# Patient Record
Sex: FEMALE | Race: WHITE | NOT HISPANIC OR LATINO | Employment: FULL TIME | URBAN - METROPOLITAN AREA
[De-identification: names, ages, dates, MRNs, and addresses within clinical notes are randomized per-mention and may not be internally consistent; named-entity substitution may affect disease eponyms.]

---

## 2017-01-16 DIAGNOSIS — Z30.41 ENCOUNTER FOR SURVEILLANCE OF CONTRACEPTIVE PILLS: Primary | ICD-10-CM

## 2017-01-16 NOTE — TELEPHONE ENCOUNTER
norgestimate-ethinyl estradiol (ORTHO-CYCLEN, SPRINTEC) 0.25-35 MG-MCG per tablet      Last Written Prescription Date: 1-  Last Fill Quantity: 84, # refills: 3  Last Office Visit with SULLY, QUANG or [facility] prescribing provider: 10-7-2016       BP Readings from Last 3 Encounters:   10/07/16 118/72   10/05/16 130/99   04/16/16 128/69     Date of last Breast Exam: na

## 2017-01-17 RX ORDER — NORGESTIMATE AND ETHINYL ESTRADIOL 0.25-0.035
1 KIT ORAL DAILY
Qty: 84 TABLET | Refills: 3 | Status: SHIPPED | OUTPATIENT
Start: 2017-01-17 | End: 2018-01-19

## 2017-01-18 ENCOUNTER — TELEPHONE (OUTPATIENT)
Dept: FAMILY MEDICINE | Facility: CLINIC | Age: 32
End: 2017-01-18

## 2017-05-12 ENCOUNTER — OFFICE VISIT (OUTPATIENT)
Dept: FAMILY MEDICINE | Facility: CLINIC | Age: 32
End: 2017-05-12
Payer: COMMERCIAL

## 2017-05-12 ENCOUNTER — TELEPHONE (OUTPATIENT)
Dept: NURSING | Facility: CLINIC | Age: 32
End: 2017-05-12

## 2017-05-12 VITALS
HEIGHT: 58 IN | SYSTOLIC BLOOD PRESSURE: 124 MMHG | WEIGHT: 159.2 LBS | BODY MASS INDEX: 33.42 KG/M2 | DIASTOLIC BLOOD PRESSURE: 78 MMHG | HEART RATE: 96 BPM | TEMPERATURE: 99.2 F

## 2017-05-12 DIAGNOSIS — R63.5 ABNORMAL WEIGHT GAIN: ICD-10-CM

## 2017-05-12 DIAGNOSIS — F41.9 ANXIETY: ICD-10-CM

## 2017-05-12 DIAGNOSIS — J45.21 MILD INTERMITTENT ASTHMA WITH ACUTE EXACERBATION: ICD-10-CM

## 2017-05-12 DIAGNOSIS — G43.009 MIGRAINE WITHOUT AURA AND WITHOUT STATUS MIGRAINOSUS, NOT INTRACTABLE: ICD-10-CM

## 2017-05-12 DIAGNOSIS — Z32.01 PREGNANCY TEST POSITIVE: Primary | ICD-10-CM

## 2017-05-12 LAB
B-HCG SERPL-ACNC: ABNORMAL IU/L (ref 0–5)
BETA HCG QUAL IFA URINE: POSITIVE
HCG SERPL QL: POSITIVE
TSH SERPL DL<=0.005 MIU/L-ACNC: 0.52 MU/L (ref 0.4–4)

## 2017-05-12 PROCEDURE — 99214 OFFICE O/P EST MOD 30 MIN: CPT | Performed by: NURSE PRACTITIONER

## 2017-05-12 PROCEDURE — 36415 COLL VENOUS BLD VENIPUNCTURE: CPT | Performed by: NURSE PRACTITIONER

## 2017-05-12 PROCEDURE — 84703 CHORIONIC GONADOTROPIN ASSAY: CPT | Performed by: NURSE PRACTITIONER

## 2017-05-12 PROCEDURE — 84702 CHORIONIC GONADOTROPIN TEST: CPT | Performed by: NURSE PRACTITIONER

## 2017-05-12 PROCEDURE — 84443 ASSAY THYROID STIM HORMONE: CPT | Performed by: NURSE PRACTITIONER

## 2017-05-12 RX ORDER — OXYCODONE AND ACETAMINOPHEN 5; 325 MG/1; MG/1
1 TABLET ORAL EVERY 4 HOURS PRN
Qty: 5 TABLET | Refills: 0 | Status: SHIPPED | OUTPATIENT
Start: 2017-05-12 | End: 2017-12-20

## 2017-05-12 RX ORDER — ALBUTEROL SULFATE 90 UG/1
2 AEROSOL, METERED RESPIRATORY (INHALATION) EVERY 6 HOURS PRN
Qty: 1 INHALER | Refills: 1 | Status: SHIPPED | OUTPATIENT
Start: 2017-05-12

## 2017-05-12 ASSESSMENT — ENCOUNTER SYMPTOMS
DIAPHORESIS: 0
FEVER: 0
NERVOUS/ANXIOUS: 1
DIARRHEA: 0
SINUS PRESSURE: 0
NAUSEA: 0
EYE DISCHARGE: 0
HEADACHES: 1
DIZZINESS: 0
CHILLS: 0
SORE THROAT: 0
FATIGUE: 1
RHINORRHEA: 0
LIGHT-HEADEDNESS: 0
EYE ITCHING: 0
UNEXPECTED WEIGHT CHANGE: 1
CONSTIPATION: 0
SHORTNESS OF BREATH: 0
ABDOMINAL PAIN: 1
DYSPHORIC MOOD: 1
COUGH: 0
WHEEZING: 0

## 2017-05-12 ASSESSMENT — ANXIETY QUESTIONNAIRES
5. BEING SO RESTLESS THAT IT IS HARD TO SIT STILL: MORE THAN HALF THE DAYS
GAD7 TOTAL SCORE: 14
3. WORRYING TOO MUCH ABOUT DIFFERENT THINGS: MORE THAN HALF THE DAYS
7. FEELING AFRAID AS IF SOMETHING AWFUL MIGHT HAPPEN: SEVERAL DAYS
2. NOT BEING ABLE TO STOP OR CONTROL WORRYING: MORE THAN HALF THE DAYS
6. BECOMING EASILY ANNOYED OR IRRITABLE: NEARLY EVERY DAY
1. FEELING NERVOUS, ANXIOUS, OR ON EDGE: MORE THAN HALF THE DAYS

## 2017-05-12 ASSESSMENT — PATIENT HEALTH QUESTIONNAIRE - PHQ9: 5. POOR APPETITE OR OVEREATING: MORE THAN HALF THE DAYS

## 2017-05-12 NOTE — LETTER
My Asthma Action Plan  Name: Emily Moreira   YOB: 1985  Date: 5/12/2017   My doctor: AMIRA Polo CNP   My clinic: [facility]        My Control Medicine:   My Rescue Medicine: Albuterol   My Asthma Severity: intermittent  Avoid your asthma triggers:                GREEN ZONE     Good Control    I feel good    No cough or wheeze    Can work, sleep and play without asthma symptoms       Take your asthma control medicine every day.     1. If exercise triggers your asthma, take your rescue medication    15 minutes before exercise or sports, and    During exercise if you have asthma symptoms  2. Spacer to use with inhaler: If you have a spacer, make sure to use it with your inhaler             YELLOW ZONE     Getting Worse  I have ANY of these:    I do not feel good    Cough or wheeze    Chest feels tight    Wake up at night   1. Keep taking your Green Zone medications  2. Start taking your rescue medicine:    every 20 minutes for up to 1 hour. Then every 4 hours for 24-48 hours.  3. If you stay in the Yellow Zone for more than 12-24 hours, contact your doctor.  4. If you do not return to the Green Zone in 12-24 hours or you get worse, start taking your oral steroid medicine if prescribed by your provider.           RED ZONE     Medical Alert - Get Help  I have ANY of these:    I feel awful    Medicine is not helping    Breathing getting harder    Trouble walking or talking    Nose opens wide to breathe       1. Take your rescue medicine NOW  2. If your provider has prescribed an oral steroid medicine, start taking it NOW  3. Call your doctor NOW  4. If you are still in the Red Zone after 20 minutes and you have not reached your doctor:    Take your rescue medicine again and    Call 911 or go to the emergency room right away    See your regular doctor within 2 weeks of an Emergency Room or Urgent Care visit for follow-up treatment.            Annual Reminders:  Meet with  Asthma Educator,  Flu Shot in the Fall, consider Pneumonia Vaccination for patients with asthma (aged 19 and older).    Pharmacy:    [id number] - [place], [place] - [address] AT [facility] & [address] [place] [address] [id number] - [place], [place] - [address]                    Asthma Triggers  How To Control Things That Make Your Asthma Worse    Triggers are things that make your asthma worse.  Look at the list below to help you find your triggers and what you can do about them.  You can help prevent asthma flare-ups by staying away from your triggers.      Trigger                                                          What you can do   Cigarette Smoke  Tobacco smoke can make asthma worse. Do not allow smoking in your home, car or around you.  Be sure no one smokes at a child s day care or school.  If you smoke, ask your health care provider for ways to help you quit.  Ask family members to quit too.  Ask your health care provider for a referral to Quit Plan to help you quit smoking, or call 1-560-628-PLAN.     Colds, Flu, Bronchitis  These are common triggers of asthma. Wash your hands often.  Don t touch your eyes, nose or mouth.  Get a flu shot every year.     Dust Mites  These are tiny bugs that live in cloth or carpet. They are too small to see. Wash sheets and blankets in hot water every week.   Encase pillows and mattress in dust mite proof covers.  Avoid having carpet if you can. If you have carpet, vacuum weekly.   Use a dust mask and HEPA vacuum.   Pollen and Outdoor Mold  Some people are allergic to trees, grass, or weed pollen, or molds. Try to keep your windows closed.  Limit time out doors when pollen count is high.   Ask you health care provider about taking medicine during allergy season.     Animal Dander  Some people are allergic to skin flakes, urine or saliva from pets with fur or feathers. Keep pets with fur or feathers out of your home.    If you can t keep  the pet outdoors, then keep the pet out of your bedroom.  Keep the bedroom door closed.  Keep pets off cloth furniture and away from stuffed toys.     Mice, Rats, and Cockroaches  Some people are allergic to the waste from these pests.   Cover food and garbage.  Clean up spills and food crumbs.  Store grease in the refrigerator.   Keep food out of the bedroom.   Indoor Mold  This can be a trigger if your home has high moisture. Fix leaking faucets, pipes, or other sources of water.   Clean moldy surfaces.  Dehumidify basement if it is damp and smelly.   Smoke, Strong Odors, and Sprays  These can reduce air quality. Stay away from strong odors and sprays, such as perfume, powder, hair spray, paints, smoke incense, paint, cleaning products, candles and new carpet.   Exercise or Sports  Some people with asthma have this trigger. Be active!  Ask your doctor about taking medicine before sports or exercise to prevent symptoms.    Warm up for 5-10 minutes before and after sports or exercise.     Other Triggers of Asthma  Cold air:  Cover your nose and mouth with a scarf.  Sometimes laughing or crying can be a trigger.  Some medicines and food can trigger asthma.

## 2017-05-12 NOTE — LETTER
[facility]  [address] [place] [address]  [address]  [phone]      May 15, 2017      Emily Moreira  [address] [facility] [address]              Dear Ms. Moreira,    The blood test that you had drawn puts you somewhere between 6-8 weeks pregnant. Please refer to OB/GYN of choice.     Please call or email with any additional questions or concerns         Sincerely,    AMIRA Berg CNP/EC CMA

## 2017-05-12 NOTE — LETTER
My Depression Action Plan  Name: Emily Moreira   Date of Birth 1985  Date: 5/12/2017    My doctor: Prachi Flores   My clinic: [address] [address] [phone]          GREEN    ZONE   Good Control    What it looks like:     Things are going generally well. You have normal up s and down s. You may even feel depressed from time to time, but bad moods usually last less than a day.   What you need to do:  1. Continue to care for yourself (see self care plan)  2. Check your depression survival kit and update it as needed  3. Follow your physician s recommendations including any medication.  4. Do not stop taking medication unless you consult with your physician first.           YELLOW         ZONE Getting Worse    What it looks like:     Depression is starting to interfere with your life.     It may be hard to get out of bed; you may be starting to isolate yourself from others.    Symptoms of depression are starting to last most all day and this has happened for several days.     You may have suicidal thoughts but they are not constant.   What you need to do:     1. Call your care team, your response to treatment will improve if you keep your care team informed of your progress. Yellow periods are signs an adjustment may need to be made.     2. Continue your self-care, even if you have to fake it!    3. Talk to someone in your support network    4. Open up your depression survival kit           RED    ZONE Medical Alert - Get Help    What it looks like:     Depression is seriously interfering with your life.     You may experience these or other symptoms: You can t get out of bed most days, can t work or engage in other necessary activities, you have trouble taking care of basic hygiene, or basic responsibilities, thoughts of suicide or death that will not go away, self-injurious behavior.     What you need to do:  1. Call your care team  and request a same-day appointment. If they are not available (weekends or after hours) call your local crisis line, emergency room or 911.          Depression Self Care Plan / Survival Kit    Self-Care for Depression  Here s the deal. Your body and mind are really not as separate as most people think.  What you do and think affects how you feel and how you feel influences what you do and think. This means if you do things that people who feel good do, it will help you feel better.  Sometimes this is all it takes.  There is also a place for medication and therapy depending on how severe your depression is, so be sure to consult with your medical provider and/ or Behavioral Health Consultant if your symptoms are worsening or not improving.     In order to better manage my stress, I will:    Exercise  Get some form of exercise, every day. This will help reduce pain and release endorphins, the  feel good  chemicals in your brain. This is almost as good as taking antidepressants!  This is not the same as joining a gym and then never going! (they count on that by the way ) It can be as simple as just going for a walk or doing some gardening, anything that will get you moving.      Hygiene   Maintain good hygiene (Get out of bed in the morning, Make your bed, Brush your teeth, Take a shower, and Get dressed like you were going to work, even if you are unemployed).  If your clothes don't fit try to get ones that do.    Diet  I will strive to eat foods that are good for me, drink plenty of water, and avoid excessive sugar, caffeine, alcohol, and other mood-altering substances.  Some foods that are helpful in depression are: complex carbohydrates, B vitamins, flaxseed, fish or fish oil, fresh fruits and vegetables.    Psychotherapy  I agree to participate in Individual Therapy (if recommended).    Medication  If prescribed medications, I agree to take them.  Missing doses can result in serious side effects.  I understand  that drinking alcohol, or other illicit drug use, may cause potential side effects.  I will not stop my medication abruptly without first discussing it with my provider.    Staying Connected With Others  I will stay in touch with my friends, family members, and my primary care provider/team.    Use your imagination  Be creative.  We all have a creative side; it doesn t matter if it s oil painting, sand castles, or mud pies! This will also kick up the endorphins.    Witness Beauty  (AKA stop and smell the roses) Take a look outside, even in mid-winter. Notice colors, textures. Watch the squirrels and birds.     Service to others  Be of service to others.  There is always someone else in need.  By helping others we can  get out of ourselves  and remember the really important things.  This also provides opportunities for practicing all the other parts of the program.    Humor  Laugh and be silly!  Adjust your TV habits for less news and crime-drama and more comedy.    Control your stress  Try breathing deep, massage therapy, biofeedback, and meditation. Find time to relax each day.     My support system    Clinic Contact:  Phone number:    Contact 1:  Phone number:    Contact 2:  Phone number:    Muslim/:  Phone number:    Therapist:  Phone number:    Local crisis center:    Phone number:    Other community support:  Phone number:

## 2017-05-12 NOTE — PATIENT INSTRUCTIONS
Try and stop smoking  Stop taking birth control pills  Start taking over the counter prenatal vitamin

## 2017-05-12 NOTE — MR AVS SNAPSHOT
"              After Visit Summary   5/12/2017    Emily Moreira    MRN: [id number]           Patient Information     Date Of Birth          1985        Visit Information        Provider Department      5/12/2017 11:20 AM Angela Michele APRN [facility]        Today's Diagnoses     Pregnancy test positive    -  1    Migraine without aura and without status migrainosus, not intractable        Mild intermittent asthma with acute exacerbation        Anxiety        Abnormal weight gain          Care Instructions    Try and stop smoking  Stop taking birth control pills  Start taking over the counter prenatal vitamin             Follow-ups after your visit        Future tests that were ordered for you today     Open Future Orders        Priority Expected Expires Ordered    US OB < 14 Weeks w Transvaginal Routine  5/12/2018 5/12/2017            Who to contact     Normal or non-critical lab and imaging results will be communicated to you by Yatangohart, letter or phone within 4 business days after the clinic has received the results. If you do not hear from us within 7 days, please contact the clinic through MyChart or phone. If you have a critical or abnormal lab result, we will notify you by phone as soon as possible.  Submit refill requests through [facility] or call your pharmacy and they will forward the refill request to us. Please allow 3 business days for your refill to be completed.          If you need to speak with a  for additional information , please call: [phone]           Additional Information About Your Visit        YatangoharMyTinks Information     [employer] lets you send messages to your doctor, view your test results, renew your prescriptions, schedule appointments and more. To sign up, go to [email or web address] . Click on \"Log in\" on the left side of the screen, which will take you to the Welcome page. Then click on \"Sign up Now\" on the right side of the " "page.     You will be asked to enter the access code listed below, as well as some personal information. Please follow the directions to create your username and password.     Your access code is: [email or web address]  Expires: 8/10/2017 12:00 PM     Your access code will  in 90 days. If you need help or a new code, please call your [facility] clinic or [phone].        Care EveryWhere ID     This is your Care EveryWhere ID. This could be used by other organizations to access your [facility] medical records  [id number]        Your Vitals Were     Pulse Temperature Height Last Period BMI (Body Mass Index)       96 99.2  F (37.3  C) (Tympanic) 4' 9.5\" (1.461 m) (LMP Unknown) 33.85 kg/m2        Blood Pressure from Last 3 Encounters:   17 124/78   10/07/16 118/72   10/05/16 (!) 130/99    Weight from Last 3 Encounters:   17 159 lb 3.2 oz (72.2 kg)   10/07/16 154 lb 2 oz (69.9 kg)   16 164 lb 12.8 oz (74.8 kg)              We Performed the Following     Asthma Action Plan (AAP)     Beta HCG qual IFA urine     DEPRESSION ACTION PLAN (DAP)     HCG, qualitative     HCG, quantitative, pregnancy     MIGRAINE ACTION PLAN     TSH with free T4 reflex          Today's Medication Changes          These changes are accurate as of: 17 12:00 PM.  If you have any questions, ask your nurse or doctor.               These medicines have changed or have updated prescriptions.        Dose/Directions    * albuterol (2.5 MG/3ML) 0.083% neb solution   This may have changed:  Another medication with the same name was added. Make sure you understand how and when to take each.        Dose:  1 vial   Take 1 vial (2.5 mg) by nebulization every 6 hours as needed for shortness of breath / dyspnea or wheezing   Quantity:  75 mL   Refills:  0       * albuterol 108 (90 BASE) MCG/ACT Inhaler   Commonly known as:  PROAIR HFA/PROVENTIL HFA/VENTOLIN HFA   This may have changed:  You were already taking a medication with the same name, " and this prescription was added. Make sure you understand how and when to take each.   Used for:  Mild intermittent asthma with acute exacerbation   Changed by:  Angela Michele APRN CNP        Dose:  2 puff   Inhale 2 puffs into the lungs every 6 hours as needed for shortness of breath / dyspnea or wheezing   Quantity:  1 Inhaler   Refills:  1       oxyCODONE-acetaminophen 5-325 MG per tablet   Commonly known as:  PERCOCET   This may have changed:  how much to take   Used for:  Migraine without aura and without status migrainosus, not intractable   Changed by:  Angela Michele APRN CNP        Dose:  1 tablet   Take 1 tablet by mouth every 4 hours as needed for pain   Quantity:  5 tablet   Refills:  0       * Notice:  This list has 2 medication(s) that are the same as other medications prescribed for you. Read the directions carefully, and ask your doctor or other care provider to review them with you.         Where to get your medicines      These medications were sent to [facility] [address]  [address] [facility] [address]     Phone:  [phone]     albuterol 108 (90 BASE) MCG/ACT Inhaler         Some of these will need a paper prescription and others can be bought over the counter.  Ask your nurse if you have questions.     Bring a paper prescription for each of these medications     oxyCODONE-acetaminophen 5-325 MG per tablet                Primary Care Provider Office Phone # Fax #    Prachi Flores PA-C [phone]       [address] [address]        Thank you!     Thank you for choosing [facility]  for your care. Our goal is always to provide you with excellent care. Hearing back from our patients is one way we can continue to improve our services. Please take a few minutes to complete the written survey that you may receive in the mail after your visit  with us. Thank you!             Your Updated Medication List - Protect others around you: Learn how to safely use, store and throw away your medicines at www.disposemymeds.org.          This list is accurate as of: 5/12/17 12:00 PM.  Always use your most recent med list.                   Brand Name Dispense Instructions for use    * albuterol (2.5 MG/3ML) 0.083% neb solution     75 mL    Take 1 vial (2.5 mg) by nebulization every 6 hours as needed for shortness of breath / dyspnea or wheezing       * albuterol 108 (90 BASE) MCG/ACT Inhaler    PROAIR HFA/PROVENTIL HFA/VENTOLIN HFA    1 Inhaler    Inhale 2 puffs into the lungs every 6 hours as needed for shortness of breath / dyspnea or wheezing       albuterol 90 MCG/ACT inhaler     1 Inhaler    2 puffs INH every 4-6 hrs       cyclobenzaprine 10 MG tablet    FLEXERIL    14 tablet    Take 1 tablet (10 mg) by mouth nightly as needed for muscle spasms       ibuprofen 400 MG tablet    ADVIL/MOTRIN    120 tablet    Take 1-2 tablets (400-800 mg) by mouth every 6 hours as needed for other (cramping)       norgestimate-ethinyl estradiol 0.25-35 MG-MCG per tablet    ORTHO-CYCLEN, SPRINTEC    84 tablet    Take 1 tablet by mouth daily       OMEPRAZOLE PO      Take 40 mg by mouth       oxyCODONE-acetaminophen 5-325 MG per tablet    PERCOCET    5 tablet    Take 1 tablet by mouth every 4 hours as needed for pain       sertraline 50 MG tablet    ZOLOFT    30 tablet    Take 1/2 tablet (25 mg) for 1-2 weeks, then increase to 1 tablet orally daily       sucralfate 1 GM tablet    CARAFATE    42 tablet    Take 1 tablet (1 g) by mouth 4 times daily       SUMAtriptan 25 MG tablet    IMITREX    9 tablet    Take 1-2 tablets (25-50 mg) by mouth at onset of headache for migraine May repeat dose in 2 hours.  Do not exceed 200 mg in 24 hours       * Notice:  This list has 2 medication(s) that are the same as other medications prescribed for you. Read the directions carefully, and ask your  doctor or other care provider to review them with you.

## 2017-05-12 NOTE — TELEPHONE ENCOUNTER
Call Type: Triage Call    Presenting Problem: Caller says that she just found out that she is  pregnant, may randi three months along. She has been having some  abominal pain that she thought might be acid reflujx. She does have  a regujlarlhy scheduled apointment for 11 am, but they don't have an  ultrasound at her clinic so she is asking her options for gaetting  an ultrasound done today.  Triage Note:  Guideline Title: Pregnancy: Abdominal Pain  Recommended Disposition: See Provider within 24 hours  Original Inclination: Wanted to speak with a nurse  Override Disposition:  Intended Action: Follow advice given  Physician Contacted: No  New onset of epigastric or right upper quadrant pain ?  YES  Signs of dehydration ? NO  IUD or cerclage in place ? NO  Fluid leaking or rupture of membranes ? NO  Severe breathing problems ? NO  Recent version procedure ? NO  New or worsening signs and symptoms that may indicate shock ? NO  Less than 20 weeks gestation AND vaginal bleeding ? NO  More than 20 weeks gestation AND vaginal bleeding ? NO  Gestation more than 37 weeks AND signs of labor ? NO  Gestation less than 20 weeks AND signs of labor ? NO  20-37 weeks gestation AND signs of labor ? NO  Generalized abdominal pain that progresses to localized pain ? NO  Sudden onset or recurring abdominal pain that radiates to upper back or shoulder ?  NO  Pain/discomfort over bladder AND urinary tract symptoms ? NO  Greater than 20 weeks AND mild abdominal pain ? NO  Passing red, black or tarry material from rectum AND onset of new signs and  symptoms of hypovolemia ? NO  Unbearable abdominal/pelvic pain or cramping ? NO  Any cardiac signs/symptoms for more than 5 minutes, now or within last hour ? NO  Vomiting red, bloody or coffee-ground material, more than streaks of blood or  scant amount (not following nosebleed within past day) ? NO  Mild abdominal cramping AND 4 or more diarrheal stools in 24 hours ? NO  Temperature 100 F (37.7  "C) or greater ? NO  Burning sensation in the epigastic area AND no other abdominal discomfort or other  related symptoms ? NO  Generalized abdominal pain that progresses to localized pain AND any of the  following: loss of appetite, vomiting starting after pain, any fever, OR unable  to carry out normal activities ? NO  Abdominal pain that has steadily worsened over hours OR has been continuous for 3  hours or more AND any of the following: loss of appetite, vomiting starting after  pain, any fever, OR unable to carry out normal activities ? NO  Sudden onset or recurring abdominal pain that radiates to upper back or shoulder  AND any of the following: loss of appetite, vomiting starting after pain, any  fever, OR unable to carry out normal activities ? NO  Pain in flank, low back, over bladder, groin or perineum AND sharp pain with  urination, or felt something \"pass\" with urination, or blood in urine ? NO  Recent amniocentesis/chorionic villus sampling ([facility]) ? NO  Minimal to mild abdominal pain or occasional abdominal cramping AND foul smelling  or unusual vaginal discharge ? NO  New onset of epigastric or right upper quadrant pain with known pregnancy induced  hypertension ? NO  Pregnant and has a temperature up to 100 F (37.7 C) that has not responded to 3  days of home care ? NO  Continuous bright red vaginal bleeding for more than 15 minutes (more than  spotting) ? NO  Gestation 20 weeks or more AND decreased fetal movement compared to previous  activity ? NO  Constipation for 3 days or more that has not resolved with home care ? NO  Unbearable headache ? NO  Abdominal or pelvic pain that has steadily worsened; has been continuous for 3  hours or more; OR that had stopped and has now returned ? NO  Injury to abdomen or pelvis ? NO  Physician Instructions:  Care Advice: Eat smaller, more frequent meals  eat slowly and allow one-half hour to relax after eating.  Call provider immediately if develop severe pain, " black, tarry stools,  bloody stools, blood-streaked or coffee ground-looking vomitus, or abdomen  swollen.

## 2017-05-12 NOTE — NURSING NOTE
"Chief Complaint   Patient presents with     Confirmation Of Pregnancy       Initial /78 (BP Location: Left arm, Patient Position: Chair, Cuff Size: Adult Regular)  Pulse 96  Temp 99.2  F (37.3  C) (Tympanic)  Ht 4' 9.5\" (1.461 m)  Wt 159 lb 3.2 oz (72.2 kg)  LMP  (LMP Unknown)  BMI 33.85 kg/m2 Estimated body mass index is 33.85 kg/(m^2) as calculated from the following:    Height as of this encounter: 4' 9.5\" (1.461 m).    Weight as of this encounter: 159 lb 3.2 oz (72.2 kg).  Medication Reconciliation: complete     April [facility]      "

## 2017-05-12 NOTE — LETTER
My Migraine Action Plan      Date: 5/12/2017     My Name: Emily Moreira   YOB: 1985  My Pharmacy:    [id number] - [place], [place] - [address] AT [facility] & [address] [place] [address] [id number] - [place], [place] - [address]       My (Preventative) Control Medicine:         My Rescue Medicine: Imitrex   My Doctor: Prachi Flores     My Clinic: [facility]  [address] [place] [id number] [phone]        GREEN ZONE = Good Control    My headache plan is working.   I can do what I need to do.           I WILL:     ? Keep managing my triggers.  ? Write in my migraine diary each time I have a headache.  ? Keep taking my preventive (controller) medicine daily.  ? Take my relief and rescue medicine as needed.             YELLOW ZONE = Not Enough Control    My headache plan isn t always working.   My headaches keep me from doing   some of the things I need to do.       I WILL:     ? Set goals to control my triggers and act on them.  ? Write in my migraine diary each time I have a headache and review it for                      patterns or new triggers.  ? Keep taking my preventive (controller) medicine daily.  ? Take my relief and rescue medicine as needed.  ? Call my doctor or clinic at if I stay in the Yellow Zone.             RED ZONE = Poor or No Control    My headache plan has  failed. I can t do anything  when I have one. My  medicines aren t working.           I WILL:   ? Set goals to control my triggers and act on them.  ? Write in my migraine diary each time I have a headache and review it for                      patterns or new triggers.  ? Keep taking my preventive (controller) medicine daily.  ? Take my relief and rescue medicine as needed.  ? Call my doctor or clinic or go to urgent care or an ER if I m having the worst                  headache of my life.  ? Call my doctor or clinic or go to urgent care or an ER if  my medicine doesn t work.  ? Let my doctor or clinic know within 2 weeks if I have gone to an urgent care or             emergency department.          Provider specific instructions:

## 2017-05-12 NOTE — PROGRESS NOTES
SUBJECTIVE:                                                    Emily Moreira is a 31 year old female who presents to clinic today for the following health issues:    Confirming pregnancy.  Positive pregnancy test at home last night.  LMP unknown.  Has only had spotting over the past couple of months.  Was not attempting pregnancy.  Had been using Mono-Linyah birth control tablets.  She did miss 1-2 doses.      Has been fatigued and feels anxious since having her son almost 2 years ago.  Symptoms have been worsening recently.  Would like thyroid checked.     Here today for multiple concerns. First is that has been taking birth control, missed a few doses. Unsure when last period was has been sexually active with 2 different partners. Postive home pregnancy test yesterday. Is not currently wanting pregnancy. Has a nearly 2 year old son and is a single parent. Previous pregnancy and birth was very hard. Does not feel like has support from parents, sons father is not in the picture. Is not sure that can handle another child all on own.     Has been trying to lose weight and is not able to. Is active walking, playing with child, works out at home, has really changed diet, is a  and is up and active most of the time. Will lose 3-4 pounds and then it will come right back. Would like to have thyroid checked.     Has been having a lot more anxiety. 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 Is not currently taking any meds for this.     Having really bad pain in stomach. Has been taking omeprazole and is not getting any better. Will have sharp shooting pain in stomach    Headaches have been a lot worse. Is not able to take ibuprofen for headaches because will increase stomach pain. Same headaches that always gets, they are just worse than usual.     Problem list and histories reviewed & adjusted, as indicated.  Additional history: as documented    Current Outpatient  Prescriptions   Medication Sig Dispense Refill     albuterol (PROAIR HFA/PROVENTIL HFA/VENTOLIN HFA) 108 (90 BASE) MCG/ACT Inhaler Inhale 2 puffs into the lungs every 6 hours as needed for shortness of breath / dyspnea or wheezing 1 Inhaler 1     oxyCODONE-acetaminophen (PERCOCET) 5-325 MG per tablet Take 1 tablet by mouth every 4 hours as needed for pain 5 tablet 0     norgestimate-ethinyl estradiol (ORTHO-CYCLEN, SPRINTEC) 0.25-35 MG-MCG per tablet Take 1 tablet by mouth daily 84 tablet 3     ibuprofen (ADVIL,MOTRIN) 400 MG tablet Take 1-2 tablets (400-800 mg) by mouth every 6 hours as needed for other (cramping) 120 tablet 0     OMEPRAZOLE PO Take 40 mg by mouth       sucralfate (CARAFATE) 1 GM tablet Take 1 tablet (1 g) by mouth 4 times daily (Patient not taking: Reported on 5/12/2017) 42 tablet 1     cyclobenzaprine (FLEXERIL) 10 MG tablet Take 1 tablet (10 mg) by mouth nightly as needed for muscle spasms (Patient not taking: Reported on 5/12/2017) 14 tablet 1     sertraline (ZOLOFT) 50 MG tablet Take 1/2 tablet (25 mg) for 1-2 weeks, then increase to 1 tablet orally daily (Patient not taking: Reported on 5/12/2017) 30 tablet 0     albuterol (2.5 MG/3ML) 0.083% nebulizer solution Take 1 vial (2.5 mg) by nebulization every 6 hours as needed for shortness of breath / dyspnea or wheezing (Patient not taking: Reported on 5/12/2017) 75 mL 0     SUMAtriptan (IMITREX) 25 MG tablet Take 1-2 tablets (25-50 mg) by mouth at onset of headache for migraine May repeat dose in 2 hours.  Do not exceed 200 mg in 24 hours (Patient not taking: Reported on 5/12/2017) 9 tablet 3     ALBUTEROL 90 MCG/ACT IN AERS 2 puffs INH every 4-6 hrs (Patient not taking: No sig reported) 1 Inhaler 2     Allergies   Allergen Reactions     Vicodin [Hydrocodone-Acetaminophen] GI Disturbance     Vomiting even with anti-nausea meds.       Reviewed and updated as needed this visit by clinical staff  Tobacco  Allergies  Meds  Med Hx  Surg Hx  Fam  "Hx  Soc Hx      Reviewed and updated as needed this visit by Provider         ROS:  Review of Systems   Constitutional: Positive for fatigue and unexpected weight change (unable to lose weight ). Negative for chills, diaphoresis and fever.   HENT: Negative for ear discharge, ear pain, hearing loss, rhinorrhea, sinus pressure and sore throat.    Eyes: Negative for discharge and itching.   Respiratory: Negative for cough, shortness of breath and wheezing.    Gastrointestinal: Positive for abdominal pain. Negative for constipation, diarrhea and nausea.   Skin: Negative for rash.   Neurological: Positive for headaches. Negative for dizziness and light-headedness.   Psychiatric/Behavioral: Positive for dysphoric mood. Negative for suicidal ideas. The patient is nervous/anxious.          OBJECTIVE:                                                    /78 (BP Location: Left arm, Patient Position: Chair, Cuff Size: Adult Regular)  Pulse 96  Temp 99.2  F (37.3  C) (Tympanic)  Ht 4' 9.5\" (1.461 m)  Wt 159 lb 3.2 oz (72.2 kg)  LMP  (LMP Unknown)  BMI 33.85 kg/m2  Body mass index is 33.85 kg/(m^2).  Physical Exam   Constitutional: She appears well-developed and well-nourished.   Cardiovascular: Normal rate and regular rhythm.    Pulmonary/Chest: Effort normal and breath sounds normal.   Neurological: She is alert.   Skin: Skin is warm and dry.   Informed that pregnancy test is positive. Very tearful, tremoring. Unsure if wants to continue pregnancy. Has been with 2 partners in the past year, unsure which one maybe the father. Would like to figure out exactly how far along is so can determine which partner is likely the father      ASSESSMENT/PLAN:                                                    1. Migraine without aura and without status migrainosus, not intractable  Stop taking NSAIDS   Ok to take APAP  Will give 1 time prescription for 5 percocet   - MIGRAINE ACTION PLAN  - oxyCODONE-acetaminophen (PERCOCET) " 5-325 MG per tablet; Take 1 tablet by mouth every 4 hours as needed for pain  Dispense: 5 tablet; Refill: 0    2. Mild intermittent asthma with acute exacerbation  Encouraged to stop smoking, will refill albuterol today   - Asthma Action Plan (AAP)  - albuterol (PROAIR HFA/PROVENTIL HFA/VENTOLIN HFA) 108 (90 BASE) MCG/ACT Inhaler; Inhale 2 puffs into the lungs every 6 hours as needed for shortness of breath / dyspnea or wheezing  Dispense: 1 Inhaler; Refill: 1    3. Anxiety  Will hold off on adding medication at this time  - DEPRESSION ACTION PLAN (DAP)    4. Pregnancy test positive  Would like to have US done today  Call placed to [place]-unable to do today  Earliest appointment  845 Monday AM,declines appointment  Time  Will check additional labs today   - Beta HCG qual IFA urine  - HCG, quantitative, pregnancy  - HCG, qualitative  - US OB < 14 Weeks w Transvaginal; Future    5. Abnormal weight gain  Check thyroid per request.   - TSH with free T4 reflex        AMIRA Polo [facility]

## 2017-05-13 ASSESSMENT — ANXIETY QUESTIONNAIRES: GAD7 TOTAL SCORE: 14

## 2017-05-13 ASSESSMENT — PATIENT HEALTH QUESTIONNAIRE - PHQ9: SUM OF ALL RESPONSES TO PHQ QUESTIONS 1-9: 8

## 2017-05-15 ENCOUNTER — TELEPHONE (OUTPATIENT)
Dept: FAMILY MEDICINE | Facility: CLINIC | Age: 32
End: 2017-05-15

## 2017-05-15 DIAGNOSIS — R11.0 NAUSEA: Primary | ICD-10-CM

## 2017-05-15 DIAGNOSIS — K21.00 GASTROESOPHAGEAL REFLUX DISEASE WITH ESOPHAGITIS: ICD-10-CM

## 2017-05-15 RX ORDER — METOCLOPRAMIDE 10 MG/1
10 TABLET ORAL 4 TIMES DAILY PRN
Qty: 60 TABLET | Refills: 1 | Status: SHIPPED | OUTPATIENT
Start: 2017-05-15 | End: 2018-02-07

## 2017-07-17 ENCOUNTER — NURSE TRIAGE (OUTPATIENT)
Dept: NURSING | Facility: CLINIC | Age: 32
End: 2017-07-17

## 2017-07-17 ENCOUNTER — APPOINTMENT (OUTPATIENT)
Dept: CT IMAGING | Facility: CLINIC | Age: 32
End: 2017-07-17
Attending: FAMILY MEDICINE
Payer: COMMERCIAL

## 2017-07-17 ENCOUNTER — HOSPITAL ENCOUNTER (EMERGENCY)
Facility: CLINIC | Age: 32
Discharge: HOME | End: 2017-07-17
Attending: FAMILY MEDICINE | Admitting: FAMILY MEDICINE
Payer: COMMERCIAL

## 2017-07-17 VITALS
TEMPERATURE: 98.1 F | WEIGHT: 155 LBS | DIASTOLIC BLOOD PRESSURE: 70 MMHG | SYSTOLIC BLOOD PRESSURE: 123 MMHG | RESPIRATION RATE: 13 BRPM | OXYGEN SATURATION: 98 % | BODY MASS INDEX: 32.96 KG/M2

## 2017-07-17 DIAGNOSIS — R07.89 CHEST WALL PAIN: ICD-10-CM

## 2017-07-17 LAB
ALBUMIN SERPL-MCNC: 3.8 G/DL (ref 3.4–5)
ALP SERPL-CCNC: 64 U/L (ref 40–150)
ALT SERPL W P-5'-P-CCNC: 28 U/L (ref 0–50)
ANION GAP SERPL CALCULATED.3IONS-SCNC: 5 MMOL/L (ref 3–14)
AST SERPL W P-5'-P-CCNC: 17 U/L (ref 0–45)
BASOPHILS # BLD AUTO: 0 10E9/L (ref 0–0.2)
BASOPHILS NFR BLD AUTO: 0.1 %
BILIRUB SERPL-MCNC: 0.2 MG/DL (ref 0.2–1.3)
BUN SERPL-MCNC: 15 MG/DL (ref 7–30)
CALCIUM SERPL-MCNC: 9.4 MG/DL (ref 8.5–10.1)
CHLORIDE SERPL-SCNC: 110 MMOL/L (ref 94–109)
CO2 SERPL-SCNC: 23 MMOL/L (ref 20–32)
CREAT SERPL-MCNC: 0.72 MG/DL (ref 0.52–1.04)
D DIMER PPP FEU-MCNC: 0.9 UG/ML FEU (ref 0–0.5)
DIFFERENTIAL METHOD BLD: ABNORMAL
EOSINOPHIL # BLD AUTO: 0.1 10E9/L (ref 0–0.7)
EOSINOPHIL NFR BLD AUTO: 1 %
ERYTHROCYTE [DISTWIDTH] IN BLOOD BY AUTOMATED COUNT: 11.7 % (ref 10–15)
GFR SERPL CREATININE-BSD FRML MDRD: ABNORMAL ML/MIN/1.7M2
GLUCOSE SERPL-MCNC: 92 MG/DL (ref 70–99)
HCT VFR BLD AUTO: 44.2 % (ref 35–47)
HGB BLD-MCNC: 14.9 G/DL (ref 11.7–15.7)
IMM GRANULOCYTES # BLD: 0 10E9/L (ref 0–0.4)
IMM GRANULOCYTES NFR BLD: 0.2 %
LYMPHOCYTES # BLD AUTO: 1.8 10E9/L (ref 0.8–5.3)
LYMPHOCYTES NFR BLD AUTO: 13.7 %
MCH RBC QN AUTO: 30.6 PG (ref 26.5–33)
MCHC RBC AUTO-ENTMCNC: 33.7 G/DL (ref 31.5–36.5)
MCV RBC AUTO: 91 FL (ref 78–100)
MONOCYTES # BLD AUTO: 0.7 10E9/L (ref 0–1.3)
MONOCYTES NFR BLD AUTO: 5.5 %
NEUTROPHILS # BLD AUTO: 10.6 10E9/L (ref 1.6–8.3)
NEUTROPHILS NFR BLD AUTO: 79.5 %
PLATELET # BLD AUTO: 234 10E9/L (ref 150–450)
POTASSIUM SERPL-SCNC: 4 MMOL/L (ref 3.4–5.3)
PROT SERPL-MCNC: 8.1 G/DL (ref 6.8–8.8)
RBC # BLD AUTO: 4.87 10E12/L (ref 3.8–5.2)
SODIUM SERPL-SCNC: 138 MMOL/L (ref 133–144)
TROPONIN I SERPL-MCNC: NORMAL UG/L (ref 0–0.04)
WBC # BLD AUTO: 13.4 10E9/L (ref 4–11)

## 2017-07-17 PROCEDURE — 25000125 ZZHC RX 250: Performed by: FAMILY MEDICINE

## 2017-07-17 PROCEDURE — 85025 COMPLETE CBC W/AUTO DIFF WBC: CPT | Performed by: FAMILY MEDICINE

## 2017-07-17 PROCEDURE — 93005 ELECTROCARDIOGRAM TRACING: CPT

## 2017-07-17 PROCEDURE — 25000132 ZZH RX MED GY IP 250 OP 250 PS 637: Performed by: FAMILY MEDICINE

## 2017-07-17 PROCEDURE — 99285 EMERGENCY DEPT VISIT HI MDM: CPT | Mod: 25

## 2017-07-17 PROCEDURE — 85379 FIBRIN DEGRADATION QUANT: CPT | Performed by: FAMILY MEDICINE

## 2017-07-17 PROCEDURE — 96374 THER/PROPH/DIAG INJ IV PUSH: CPT

## 2017-07-17 PROCEDURE — 25000128 H RX IP 250 OP 636: Performed by: FAMILY MEDICINE

## 2017-07-17 PROCEDURE — 99285 EMERGENCY DEPT VISIT HI MDM: CPT | Mod: 25 | Performed by: FAMILY MEDICINE

## 2017-07-17 PROCEDURE — 84484 ASSAY OF TROPONIN QUANT: CPT | Performed by: FAMILY MEDICINE

## 2017-07-17 PROCEDURE — 96361 HYDRATE IV INFUSION ADD-ON: CPT

## 2017-07-17 PROCEDURE — 80053 COMPREHEN METABOLIC PANEL: CPT | Performed by: FAMILY MEDICINE

## 2017-07-17 PROCEDURE — 71260 CT THORAX DX C+: CPT

## 2017-07-17 PROCEDURE — 93010 ELECTROCARDIOGRAM REPORT: CPT | Performed by: FAMILY MEDICINE

## 2017-07-17 RX ORDER — TRAMADOL HYDROCHLORIDE 50 MG/1
50-100 TABLET ORAL EVERY 6 HOURS PRN
Qty: 20 TABLET | Refills: 0 | Status: SHIPPED | OUTPATIENT
Start: 2017-07-17 | End: 2017-12-20

## 2017-07-17 RX ORDER — IOPAMIDOL 755 MG/ML
70 INJECTION, SOLUTION INTRAVASCULAR ONCE
Status: COMPLETED | OUTPATIENT
Start: 2017-07-17 | End: 2017-07-17

## 2017-07-17 RX ORDER — ACETAMINOPHEN 500 MG
1000 TABLET ORAL ONCE
Status: COMPLETED | OUTPATIENT
Start: 2017-07-17 | End: 2017-07-17

## 2017-07-17 RX ORDER — HYDROMORPHONE HYDROCHLORIDE 1 MG/ML
0.5 INJECTION, SOLUTION INTRAMUSCULAR; INTRAVENOUS; SUBCUTANEOUS ONCE
Status: COMPLETED | OUTPATIENT
Start: 2017-07-17 | End: 2017-07-17

## 2017-07-17 RX ADMIN — SODIUM CHLORIDE 500 ML: 9 INJECTION, SOLUTION INTRAVENOUS at 11:28

## 2017-07-17 RX ADMIN — HYDROMORPHONE HYDROCHLORIDE 0.5 MG: 1 INJECTION, SOLUTION INTRAMUSCULAR; INTRAVENOUS; SUBCUTANEOUS at 12:32

## 2017-07-17 RX ADMIN — IOPAMIDOL 70 ML: 755 INJECTION, SOLUTION INTRAVENOUS at 11:53

## 2017-07-17 RX ADMIN — SODIUM CHLORIDE 97 ML: 9 INJECTION, SOLUTION INTRAVENOUS at 11:53

## 2017-07-17 RX ADMIN — ACETAMINOPHEN 1000 MG: 500 TABLET ORAL at 11:30

## 2017-07-17 NOTE — DISCHARGE INSTRUCTIONS
Return to the Emergency Room if the following occurs:     Worsened pain, fever >101, fainting, trouble breathing, or for any concern at anytime.    Or, follow-up with the following provider as we discussed:     Return to your primary doctor as needed, or if not improved over the next 7 days.    Medications discussed:    Tylenol for comfort, as needed.  Tramadol for pain not relieved by the above.    If you received pain-relieving or sedating medication during your time in the ER, avoid alcohol, driving automobiles, or working with machinery.  Also, a responsible adult must stay with you.      If you had X-rays or labs done we will attempt to contact you if there is a change needed in your care.      Call the Nurse Advice Line at [phone] or [phone] for any concern at anytime.

## 2017-07-17 NOTE — ED AVS SNAPSHOT
[facility] Emergency Department    [address] [facility] [address]    Phone:  [phone]    Fax:  [phone]                                       Emily Moreira   MRN: [id number]    Department:  [facility] Emergency Department   Date of Visit:  7/17/2017           After Visit Summary Signature Page     I have received my discharge instructions, and my questions have been answered. I have discussed any challenges I see with this plan with the nurse or doctor.    ..........................................................................................................................................  Patient/Patient Representative Signature      ..........................................................................................................................................  Patient Representative Print Name and Relationship to Patient    ..................................................               ................................................  Date                                            Time    ..........................................................................................................................................  Reviewed by Signature/Title    ...................................................              ..............................................  Date                                                            Time

## 2017-07-17 NOTE — TELEPHONE ENCOUNTER
Reason for Disposition    SEVERE chest pain    Additional Information    Negative: Severe difficulty breathing (e.g., struggling for each breath, speaks in single words)    Negative: Difficult to awaken or acting confused (e.g., disoriented, slurred speech)    Negative: Shock suspected (e.g., cold/pale/clammy skin, too weak to stand, low BP, rapid pulse)    Negative: [1] Chest pain lasts > 5 minutes AND [2] history of heart disease  (i.e., heart attack, bypass surgery, angina, angioplasty, CHF; not just a heart murmur)    Negative: [1] Chest pain lasts > 5 minutes AND [2] described as crushing, pressure-like, or heavy    Negative: [1] Chest pain lasts > 5 minutes AND [2] age > 50    Negative: [1] Chest pain lasts > 5 minutes AND [2] age > 30 AND [3] at least one cardiac risk factor (i.e., hypertension, diabetes, obesity, smoker or strong family history of heart disease)    Negative: [1] Chest pain lasts > 5 minutes AND [2] not relieved with nitroglycerin    Negative: Passed out (i.e., lost consciousness, collapsed and was not responding)    Negative: Heart beating < 50 beats per minute OR > 140 beats per minute    Negative: Visible sweat on face or sweat dripping down face    Negative: Sounds like a life-threatening emergency to the triager    Negative: Followed a chest injury    Protocols used: CHEST PAIN-ADULT-  She will work on finding someone to bring her in to the ER.  Genia Duarte RN-[facility]

## 2017-07-17 NOTE — ED AVS SNAPSHOT
[facility] Emergency Department    [address] [address]    Phone:  [phone]    Fax:  [phone]                                       Emily Moreira   MRN: [id number]    Department:  [facility] Emergency Department   Date of Visit:  7/17/2017           Patient Information     Date Of Birth          1985        Your diagnoses for this visit were:     Chest wall pain        You were seen by Suresh Tejeda MD.        Discharge Instructions       Return to the Emergency Room if the following occurs:     Worsened pain, fever >101, fainting, trouble breathing, or for any concern at anytime.    Or, follow-up with the following provider as we discussed:     Return to your primary doctor as needed, or if not improved over the next 7 days.    Medications discussed:    Tylenol for comfort, as needed.  Tramadol for pain not relieved by the above.    If you received pain-relieving or sedating medication during your time in the ER, avoid alcohol, driving automobiles, or working with machinery.  Also, a responsible adult must stay with you.      If you had X-rays or labs done we will attempt to contact you if there is a change needed in your care.      Call the Nurse Advice Line at [phone] or [phone] for any concern at anytime.      Future Appointments        Provider Department Dept Phone Center    7/17/2017 3:00 PM [facility] ROOM 1 [facility] [phone] [facility]      24 Hour Appointment Hotline       To make an appointment at any [place] clinic, call [phone] ([phone]). If you don't have a family doctor or clinic, we will help you find one. [place] clinics are conveniently located to serve the needs of you and your family.             Review of your medicines      START taking        Dose / Directions Last dose taken    traMADol 50 MG tablet   Commonly known as:  ULTRAM   Dose:   mg   Quantity:  20 tablet        Take 1-2  tablets ( mg) by mouth every 6 hours as needed for pain   Refills:  0          Our records show that you are taking the medicines listed below. If these are incorrect, please call your family doctor or clinic.        Dose / Directions Last dose taken    * albuterol (2.5 MG/3ML) 0.083% neb solution   Dose:  1 vial   Quantity:  75 mL        Take 1 vial (2.5 mg) by nebulization every 6 hours as needed for shortness of breath / dyspnea or wheezing   Refills:  0        * albuterol 108 (90 BASE) MCG/ACT Inhaler   Commonly known as:  PROAIR HFA/PROVENTIL HFA/VENTOLIN HFA   Dose:  2 puff   Quantity:  1 Inhaler        Inhale 2 puffs into the lungs every 6 hours as needed for shortness of breath / dyspnea or wheezing   Refills:  1        cyclobenzaprine 10 MG tablet   Commonly known as:  FLEXERIL   Dose:  10 mg   Quantity:  14 tablet        Take 1 tablet (10 mg) by mouth nightly as needed for muscle spasms   Refills:  1        ibuprofen 400 MG tablet   Commonly known as:  ADVIL/MOTRIN   Dose:  400-800 mg   Quantity:  120 tablet        Take 1-2 tablets (400-800 mg) by mouth every 6 hours as needed for other (cramping)   Refills:  0        metoclopramide 10 MG tablet   Commonly known as:  REGLAN   Dose:  10 mg   Quantity:  60 tablet        Take 1 tablet (10 mg) by mouth 4 times daily as needed   Refills:  1        norgestimate-ethinyl estradiol 0.25-35 MG-MCG per tablet   Commonly known as:  ORTHO-CYCLEN, SPRINTEC   Dose:  1 tablet   Quantity:  84 tablet        Take 1 tablet by mouth daily   Refills:  3        OMEPRAZOLE PO   Dose:  40 mg        Take 40 mg by mouth daily as needed   Refills:  0        oxyCODONE-acetaminophen 5-325 MG per tablet   Commonly known as:  PERCOCET   Dose:  1 tablet   Quantity:  5 tablet        Take 1 tablet by mouth every 4 hours as needed for pain   Refills:  0        sucralfate 1 GM tablet   Commonly known as:  CARAFATE   Dose:  1 g   Quantity:  42 tablet        Take 1 tablet (1 g) by mouth 4  times daily   Refills:  1        SUMAtriptan 25 MG tablet   Commonly known as:  IMITREX   Dose:  25-50 mg   Quantity:  9 tablet        Take 1-2 tablets (25-50 mg) by mouth at onset of headache for migraine May repeat dose in 2 hours.  Do not exceed 200 mg in 24 hours   Refills:  3        * Notice:  This list has 2 medication(s) that are the same as other medications prescribed for you. Read the directions carefully, and ask your doctor or other care provider to review them with you.            Prescriptions were sent or printed at these locations (1 Prescription)                   Other Prescriptions                Printed at Department/Unit printer (1 of 1)         traMADol (ULTRAM) 50 MG tablet                Procedures and tests performed during your visit     CBC with platelets, differential    CT Chest Pulmonary Embolism w Contrast    Comprehensive metabolic panel    D dimer quantitative    EKG 12 lead    Troponin I      Orders Needing Specimen Collection     None      Pending Results     No orders found from 7/15/2017 to 7/18/2017.            Pending Culture Results     No orders found from 7/15/2017 to 7/18/2017.            Pending Results Instructions     If you had any lab results that were not finalized at the time of your Discharge, you can call the ED Lab Result RN at [phone]. You will be contacted by this team for any positive Lab results or changes in treatment. The nurses are available 7 days a week from 10A to 6:30P.  You can leave a message 24 hours per day and they will return your call.        Test Results From Your Hospital Stay        7/17/2017 10:48 AM      Component Results     Component Value Ref Range & Units Status    Troponin I ES  0.000 - 0.045 ug/L Final    <0.015  The 99th percentile for upper reference range is 0.045 ug/L.  Troponin values in   the range of 0.045 - 0.120 ug/L may be associated with risks of adverse   clinical events.           7/17/2017 10:37 AM      Component  Results     Component Value Ref Range & Units Status    WBC 13.4 (H) 4.0 - 11.0 10e9/L Final    RBC Count 4.87 3.8 - 5.2 10e12/L Final    Hemoglobin 14.9 11.7 - 15.7 g/dL Final    Hematocrit 44.2 35.0 - 47.0 % Final    MCV 91 78 - 100 fl Final    MCH 30.6 26.5 - 33.0 pg Final    MCHC 33.7 31.5 - 36.5 g/dL Final    RDW 11.7 10.0 - 15.0 % Final    Platelet Count 234 150 - 450 10e9/L Final    Diff Method Automated Method  Final    % Neutrophils 79.5 % Final    % Lymphocytes 13.7 % Final    % Monocytes 5.5 % Final    % Eosinophils 1.0 % Final    % Basophils 0.1 % Final    % Immature Granulocytes 0.2 % Final    Absolute Neutrophil 10.6 (H) 1.6 - 8.3 10e9/L Final    Absolute Lymphocytes 1.8 0.8 - 5.3 10e9/L Final    Absolute Monocytes 0.7 0.0 - 1.3 10e9/L Final    Absolute Eosinophils 0.1 0.0 - 0.7 10e9/L Final    Absolute Basophils 0.0 0.0 - 0.2 10e9/L Final    Abs Immature Granulocytes 0.0 0 - 0.4 10e9/L Final         7/17/2017 10:48 AM      Component Results     Component Value Ref Range & Units Status    Sodium 138 133 - 144 mmol/L Final    Potassium 4.0 3.4 - 5.3 mmol/L Final    Chloride 110 (H) 94 - 109 mmol/L Final    Carbon Dioxide 23 20 - 32 mmol/L Final    Anion Gap 5 3 - 14 mmol/L Final    Glucose 92 70 - 99 mg/dL Final    Urea Nitrogen 15 7 - 30 mg/dL Final    Creatinine 0.72 0.52 - 1.04 mg/dL Final    GFR Estimate >90  Non  GFR Calc   >60 mL/min/1.7m2 Final    GFR Estimate If Black >90   GFR Calc   >60 mL/min/1.7m2 Final    Calcium 9.4 8.5 - 10.1 mg/dL Final    Bilirubin Total 0.2 0.2 - 1.3 mg/dL Final    Albumin 3.8 3.4 - 5.0 g/dL Final    Protein Total 8.1 6.8 - 8.8 g/dL Final    Alkaline Phosphatase 64 40 - 150 U/L Final    ALT 28 0 - 50 U/L Final    AST 17 0 - 45 U/L Final         7/17/2017 10:48 AM      Component Results     Component Value Ref Range & Units Status    D Dimer 0.9 (H) 0.0 - 0.50 ug/ml FEU Final    This D-dimer assay is intended for use in conjunction with a  clinical pretest   probability assessment model to exclude pulmonary embolism (PE) and deep   venous   thrombosis (DVT) in outpatients suspected of PE or DVT. The cut-off value is   0.5 ug/mL FEU.           7/17/2017 12:46 PM      Narrative     CT CHEST WITH CONTRAST   7/17/2017 12:04 PM     HISTORY: Chest pain.    COMPARISON: None.    TECHNIQUE: Following the uneventful administration of 70 ml isovue-370  intravenous contrast, helical sections were acquired through the lungs  according to the pulmonary embolism protocol. Coronal reconstructions  were generated. Radiation dose for this scan was reduced using  automated exposure control, adjustment of the mA and/or kV according  to the patient's size, or iterative reconstruction technique.    FINDINGS: No visualized pulmonary embolus. The thoracic aorta is  normal in caliber without dissection.    A tiny 0.3 cm nodule in the left lung apex is likely of benign  etiology given its small size and the patient's young age. Minimal  atelectasis in the dependent aspects of the lungs. A few areas of  air-trapping are scattered within the posterior aspects of the lungs.  The lungs are otherwise clear. No pleural or pericardial effusion. No  enlarged lymph nodes in the chest.    Scan through the upper abdomen is significant for a 0.6 cm enhancing  focus in the lateral segment of the left lobe of the liver that is too  small to characterize, but is likely of benign etiology and could  represent a hemangioma.        Impression     IMPRESSION:   1. No visualized pulmonary embolus.  2. A few areas of air-trapping scattered within the lungs. These are  nonspecific, but could relate to bronchiolitis or asthma.  3. No other evidence of active pulmonary disease.    MARZENA BARR MD                Thank you for choosing [facility]       Thank you for choosing [facility] for your care. Our goal is always to provide you with excellent care. Hearing back from our patients is one way we can  "continue to improve our services. Please take a few minutes to complete the written survey that you may receive in the mail after you visit with us. Thank you!        [employer] Information     Glance lets you send messages to your doctor, view your test results, renew your prescriptions, schedule appointments and more. To sign up, go to [email or web address] do [email or web address]?/Glance . Click on \"Log in\" on the left side of the screen, which will take you to the Welcome page. Then click on \"Sign up Now\" on the right side of the page.     You will be asked to enter the access code listed below, as well as some personal information. Please follow the directions to create your username and password.     Your access code is: [email or web address]  Expires: 8/10/2017 12:00 PM     Your access code will  in 90 days. If you need help or a new code, please call your [facility] clinic or [phone].        Care EveryWhere ID     This is your Care EveryWhere ID. This could be used by other organizations to access your [facility] medical records  [id number]        Equal Access to Services     [facility]: Hadhesham Suárez, wadesire segundo, qanaya marsh, harriet mcgill. So [facility] [phone].    ATENCIÓN: Si habla español, tiene a allen disposición servicios gratuitos de asistencia lingüística. Myesha al [phone].    We comply with applicable federal civil rights laws and [place] laws. We do not discriminate on the basis of race, color, national origin, age, disability sex, sexual orientation or gender identity.            After Visit Summary       This is your record. Keep this with you and show to your community pharmacist(s) and doctor(s) at your next visit.                  "

## 2017-07-17 NOTE — ED PROVIDER NOTES
"HPI  Patient is a 31-year-old female presenting with left-sided chest pain.  She comes in by private car.  She has a known history of anxiety and reflux.  No personal history or family history of blood dyscrasias.  She does not take medication for anticoagulation.  History of renal lithiasis.  She smokes.  She drinks alcohol.  Used marijuana in the distant past.    The patient awoke at about 6:00 AM today with left-sided chest pain.  It hurt when she would breathe.  It hurt when she would move.  She did not feel short of breath at the time.  She was able to fall back asleep.  She awoke later this morning with persistent pain.  The pain was more severe and especially worse again with breathing or movement.  She feels short of breath currently, \"because it hurts when I try to breathe.\"  No recent cough or fever.  No leg pain or swelling.  No trauma or injury.  No overuse.  No skin rash.  She does have reflux on a regular basis but this is not necessarily worse recently.  No radiating symptoms.  No nausea or vomiting.  No lightheadedness or fainting.  No palpitations.  No abdominal pain.  No back pain.    ROS: All other review of systems are negative other than that noted above.   PMH: Reviewed.  SH: Reviewed.  FH: Reviewed.      PHYSICAL  /79  Temp 98.1  F (36.7  C) (Oral)  Resp 16  Wt 70.3 kg (155 lb)  SpO2 98%  BMI 32.96 kg/m2  General: Patient is alert and in moderate distress.  Quite concerned.  Fit.  Neurological: Alert.  Moving upper and lower extremities equally, bilaterally.  Head / Neck: Atraumatic.  Ears: Not done.  Eyes: Pupils are equal, round, and reactive.  Normal conjunctiva.  Nose: Midline.  No epistaxis.  Mouth / Throat: No ulcerations or lesions.  Upper pharynx is not erythematous.  Moist.  Respiratory: No respiratory distress. CTA B.  Cardiovascular: Regular rhythm.  Peripheral extremities are warm.  No edema.  No calf tenderness.  Abdomen / Pelvis: Not tender.  No distention.  Soft " throughout.  Genitalia: Not done.  Musculoskeletal: She has tenderness when palpating in the left upper chest.  There is pain with any movement or with any large inspiration.  Skin: No evidence of rash or trauma.        PHYSICIAN  1000.  Patient presents with chest pain, as above.  D-dimer, CBC, comprehensive panel, troponin pending.  EKG shows normal sinus rhythm without ischemic change, as below.  Imaging to follow based on results.    Labs Ordered and Resulted from Time of ED Arrival Up to the Time of Departure from the ED   CBC WITH PLATELETS DIFFERENTIAL - Abnormal; Notable for the following:        Result Value    WBC 13.4 (*)     Absolute Neutrophil 10.6 (*)     All other components within normal limits   COMPREHENSIVE METABOLIC PANEL - Abnormal; Notable for the following:     Chloride 110 (*)     All other components within normal limits   D DIMER QUANTITATIVE - Abnormal; Notable for the following:     D Dimer 0.9 (*)     All other components within normal limits   TROPONIN I       EKG  (1005)   Rate: 78     Rhythm: sinus     Axis: nl  Intervals: LA (12-2) 144, QRS (<12) 81, QTc (>5) 383  P wave: nl     QRS complex: nl  ST segment / T-wave: nl  Conclusion: nl    1124.  D-dimer is elevated.  CT scan of her chest ordered.  Tylenol oral dose ordered.  She seems comfortable in the room at this point.  No respiratory distress.    IMAGING  Images reviewed by me.  Radiology report also reviewed.  CT Chest Pulmonary Embolism w Contrast   Final Result   IMPRESSION:    1. No visualized pulmonary embolus.   2. A few areas of air-trapping scattered within the lungs. These are   nonspecific, but could relate to bronchiolitis or asthma.   3. No other evidence of active pulmonary disease.      MARZENA BARR MD        1313.  CT imaging is unremarkable for acute change.  No concerning findings are identified.  No further work up required.  I reviewed the results with the patient and her mother in law.  Tramadol  prescription given, #20 tablets.  Presumably this is going to be a chest wall related source of pain.  Follow up discussed.  Return for worsening as discussed.      IMPRESSION    ICD-10-CM    1. Chest wall pain R07.89            Critical Care time:  none                      Suresh Tejeda MD  07/17/17 9950

## 2017-07-17 NOTE — ED NOTES
Left sided CP.  reproducible with deep breaths and movement.  Denies hx of blood clots.  No blood thinner use.  Pt has hx of GERD.  Pain started at 5am today.  Denies cardiac issues with family or self.  Pt states her pain is 8/10 with movement or deep breaths.  Pt denies any recent illness or injury.  Pt was under stress this weekend d/t son being ill. MD at bedside.

## 2017-10-06 ENCOUNTER — TELEPHONE (OUTPATIENT)
Dept: FAMILY MEDICINE | Facility: CLINIC | Age: 32
End: 2017-10-06

## 2017-10-06 NOTE — LETTER
[facility]  [address] [address]  [phone]      October 6, 2017      Emily Moreira  [address] [address]        Dear Emily,     Please fill out and return the enclosed asthma questionnaire to ensure your asthma is being managed appropriately.  Included is a stamped envelope addressed to [facility] for your convenience.     Thank you for choosing [facility] for your healthcare needs.    Sincerely,     [facility]

## 2017-10-06 NOTE — TELEPHONE ENCOUNTER
Panel Management Review      Patient has the following on her problem list:   Asthma review   No flowsheet data found.   1. Is Asthma diagnosis on the Problem List? Yes    2. Is Asthma listed on Health Maintenance? Yes    3. Patient is due for:  ACT        Composite cancer screening  Chart review shows that this patient is due/due soon for the following None  Summary:    Patient is due/failing the following:   ACT    Action needed:   Patient needs to do ACT.    Type of outreach:    Sent letter.    Questions for provider review:    None                                                                                                                                    Sandy Coreas CMA

## 2017-10-24 DIAGNOSIS — K29.00 ACUTE GASTRITIS WITHOUT HEMORRHAGE, UNSPECIFIED GASTRITIS TYPE: Primary | ICD-10-CM

## 2017-10-24 NOTE — TELEPHONE ENCOUNTER
Marita refill sent, please call patient, she is due for a recheck for further refills and to discuss this medication further.  She should schedule a physical with pap.   Magaly Simon PA-C

## 2017-10-24 NOTE — TELEPHONE ENCOUNTER
omeprazole      Last Written Prescription Date: 7-17  Last Fill Quantity: 60,  # refills: 0   Last Office Visit with G, P or [facility] prescribing provider: 05-

## 2017-10-27 ENCOUNTER — OFFICE VISIT (OUTPATIENT)
Dept: FAMILY MEDICINE | Facility: CLINIC | Age: 32
End: 2017-10-27
Payer: COMMERCIAL

## 2017-10-27 VITALS
SYSTOLIC BLOOD PRESSURE: 124 MMHG | WEIGHT: 166.4 LBS | TEMPERATURE: 97.6 F | BODY MASS INDEX: 34.93 KG/M2 | DIASTOLIC BLOOD PRESSURE: 68 MMHG | HEART RATE: 80 BPM | HEIGHT: 58 IN

## 2017-10-27 DIAGNOSIS — L30.9 ECZEMA, UNSPECIFIED TYPE: Primary | ICD-10-CM

## 2017-10-27 DIAGNOSIS — F41.1 GAD (GENERALIZED ANXIETY DISORDER): ICD-10-CM

## 2017-10-27 DIAGNOSIS — K21.00 GASTROESOPHAGEAL REFLUX DISEASE WITH ESOPHAGITIS: ICD-10-CM

## 2017-10-27 DIAGNOSIS — L03.019 CELLULITIS OF FINGER, UNSPECIFIED LATERALITY: ICD-10-CM

## 2017-10-27 PROCEDURE — 87186 SC STD MICRODIL/AGAR DIL: CPT | Performed by: PHYSICIAN ASSISTANT

## 2017-10-27 PROCEDURE — 87205 SMEAR GRAM STAIN: CPT | Performed by: PHYSICIAN ASSISTANT

## 2017-10-27 PROCEDURE — 99214 OFFICE O/P EST MOD 30 MIN: CPT | Performed by: PHYSICIAN ASSISTANT

## 2017-10-27 PROCEDURE — 87070 CULTURE OTHR SPECIMN AEROBIC: CPT | Performed by: PHYSICIAN ASSISTANT

## 2017-10-27 PROCEDURE — 87077 CULTURE AEROBIC IDENTIFY: CPT | Performed by: PHYSICIAN ASSISTANT

## 2017-10-27 RX ORDER — SULFAMETHOXAZOLE/TRIMETHOPRIM 800-160 MG
1 TABLET ORAL 2 TIMES DAILY
Qty: 20 TABLET | Refills: 0 | Status: SHIPPED | OUTPATIENT
Start: 2017-10-27 | End: 2017-11-29

## 2017-10-27 RX ORDER — TRIAMCINOLONE ACETONIDE 1 MG/G
CREAM TOPICAL
Qty: 30 G | Refills: 0 | Status: SHIPPED | OUTPATIENT
Start: 2017-10-27 | End: 2017-11-29

## 2017-10-27 RX ORDER — HYDROXYZINE HYDROCHLORIDE 25 MG/1
25-50 TABLET, FILM COATED ORAL EVERY 6 HOURS PRN
Qty: 45 TABLET | Refills: 1 | Status: SHIPPED | OUTPATIENT
Start: 2017-10-27

## 2017-10-27 RX ORDER — ESCITALOPRAM OXALATE 10 MG/1
10 TABLET ORAL DAILY
Qty: 30 TABLET | Refills: 0 | Status: SHIPPED | OUTPATIENT
Start: 2017-10-27

## 2017-10-27 ASSESSMENT — ANXIETY QUESTIONNAIRES
5. BEING SO RESTLESS THAT IT IS HARD TO SIT STILL: NOT AT ALL
1. FEELING NERVOUS, ANXIOUS, OR ON EDGE: SEVERAL DAYS
3. WORRYING TOO MUCH ABOUT DIFFERENT THINGS: MORE THAN HALF THE DAYS
2. NOT BEING ABLE TO STOP OR CONTROL WORRYING: SEVERAL DAYS
7. FEELING AFRAID AS IF SOMETHING AWFUL MIGHT HAPPEN: SEVERAL DAYS

## 2017-10-27 ASSESSMENT — PATIENT HEALTH QUESTIONNAIRE - PHQ9
5. POOR APPETITE OR OVEREATING: SEVERAL DAYS
SUM OF ALL RESPONSES TO PHQ QUESTIONS 1-9: 2

## 2017-10-27 NOTE — PROGRESS NOTES
SUBJECTIVE:   Emily Moreira is a 32 year old female who presents to clinic today for the following health issues:    Rash  Onset:5 days    Description:   Location: palms of both hands  Character: blotchy, raised, flakey, painful, burning, draining, red, blanches to palpitation  Itching (Pruritis): YES    Progression of Symptoms:  worsening and constant    Accompanying Signs & Symptoms:  Fever: no   Body aches or joint pain: no   Sore throat symptoms: YES  Recent cold symptoms: YES    History:   Previous similar rash: YES    Precipitating factors:   Exposure to similar rash: no   New exposures: None   Recent travel: no     Alleviating factors:  Nothing.    Therapies Tried and outcome: Clobetasol Proplonae ointment with no relief, liquid band aid, bleach soaks, peroxide soaks, and aquaphor, and eczema relief lotions all with no relief.      GERD/Heartburn       Duration: years    Description (location/character/radiation): Abdominal pains, nausea, and regurgitation.    Intensity:  moderate    Accompanying signs and symptoms:  food getting stuck: YES  nausea/vomiting/blood: YES  abdominal pain: YES  black/tarry or bloody stools: YES- in the past:    History (similar episodes/previous evaluation): Has had this in the past, has had an endoscopy, colonoscopy, and previous treatment.     Precipitating or alleviating factors:  worse with spicy foods and red sauces.  current NSAID/Aspirin use: YES- Ibuprofen with headaches.    Therapies tried and outcome: Omeprazole (Prilosec) and medication helpful       Anxiety Follow-Up    Status since last visit: Worsened     Other associated symptoms: Been having panic attacks.    Complicating factors:   Significant life event: Yes-  Trying to get a house. Newer job.   Current substance abuse: Cannabis  Depression symptoms: No  MAURI-7 SCORE 3/1/2016 10/7/2016 5/12/2017   Total Score 11 8 14       GAD7      Amount of exercise or physical activity: 2-3 days/week for an average of  greater than 60 minutes    Problems taking medications regularly: No    Medication side effects: none    Diet: regular (no restrictions)    Clobetasol ointment used PRN, using it sparingly over the past week because it causes burning.        She c/o stomach pains and would like a refill on her omeprazole.  She has been on this for 20+ years, uses it PRN (few times per week).  Cannot take tums due to history of kidney stones.        Problem list and histories reviewed & adjusted, as indicated.  Additional history: as documented    Patient Active Problem List   Diagnosis     Supervision of normal first pregnancy     Mild intermittent asthma     Kidney stones     Asthma     Atopic dermatitis and related condition     Drug dependence, in remission (H)     Migraine     Tobacco use disorder     Gastritis     Anxiety     Past Surgical History:   Procedure Laterality Date     HC ASPIRATION &/OR INJECTION GANGLION CYST, ANY      left foot     MOUTH SURGERY      wisdom teeth       Social History   Substance Use Topics     Smoking status: Current Every Day Smoker     Packs/day: 1.00     Types: Cigarettes     Smokeless tobacco: Never Used     Alcohol use Yes      Comment: ocasional     Family History   Problem Relation Age of Onset     Alcohol/Drug Mother      alcohol     DIABETES Maternal Grandfather      HEART DISEASE Maternal Grandfather      bypass/MI     Hypertension Maternal Grandfather      DIABETES Paternal Grandmother      HEART DISEASE Paternal Grandmother      Obesity Paternal Grandmother      Hypertension Paternal Grandmother      Cardiovascular Paternal Grandfather      MI         Current Outpatient Prescriptions   Medication Sig Dispense Refill     sulfamethoxazole-trimethoprim (BACTRIM DS) 800-160 MG per tablet Take 1 tablet by mouth 2 times daily for 10 days 20 tablet 0     triamcinolone (KENALOG) 0.1 % cream Apply sparingly to affected area two times daily for 10- 14 days. 30 g 0     escitalopram (LEXAPRO) 10  MG tablet Take 1 tablet (10 mg) by mouth daily 30 tablet 0     hydrOXYzine (ATARAX) 25 MG tablet Take 1-2 tablets (25-50 mg) by mouth every 6 hours as needed for anxiety 45 tablet 1     ranitidine (ZANTAC) 300 MG tablet Take 1 tablet (300 mg) by mouth At Bedtime 30 tablet 1     omeprazole (PRILOSEC) 20 MG CR capsule Take 1 capsule (20 mg) by mouth daily as needed Needs to be seen by provider for further refills 30 capsule 0     metoclopramide (REGLAN) 10 MG tablet Take 1 tablet (10 mg) by mouth 4 times daily as needed 60 tablet 1     albuterol (PROAIR HFA/PROVENTIL HFA/VENTOLIN HFA) 108 (90 BASE) MCG/ACT Inhaler Inhale 2 puffs into the lungs every 6 hours as needed for shortness of breath / dyspnea or wheezing 1 Inhaler 1     norgestimate-ethinyl estradiol (ORTHO-CYCLEN, SPRINTEC) 0.25-35 MG-MCG per tablet Take 1 tablet by mouth daily 84 tablet 3     traMADol (ULTRAM) 50 MG tablet Take 1-2 tablets ( mg) by mouth every 6 hours as needed for pain (Patient not taking: Reported on 10/27/2017) 20 tablet 0     oxyCODONE-acetaminophen (PERCOCET) 5-325 MG per tablet Take 1 tablet by mouth every 4 hours as needed for pain (Patient not taking: Reported on 10/27/2017) 5 tablet 0     sucralfate (CARAFATE) 1 GM tablet Take 1 tablet (1 g) by mouth 4 times daily (Patient not taking: Reported on 5/12/2017) 42 tablet 1     cyclobenzaprine (FLEXERIL) 10 MG tablet Take 1 tablet (10 mg) by mouth nightly as needed for muscle spasms (Patient not taking: Reported on 10/27/2017) 14 tablet 1     albuterol (2.5 MG/3ML) 0.083% nebulizer solution Take 1 vial (2.5 mg) by nebulization every 6 hours as needed for shortness of breath / dyspnea or wheezing (Patient not taking: Reported on 10/27/2017) 75 mL 0     SUMAtriptan (IMITREX) 25 MG tablet Take 1-2 tablets (25-50 mg) by mouth at onset of headache for migraine May repeat dose in 2 hours.  Do not exceed 200 mg in 24 hours (Patient not taking: Reported on 5/12/2017) 9 tablet 3      "ibuprofen (ADVIL,MOTRIN) 400 MG tablet Take 1-2 tablets (400-800 mg) by mouth every 6 hours as needed for other (cramping) (Patient not taking: Reported on 10/27/2017) 120 tablet 0     BP Readings from Last 3 Encounters:   10/27/17 124/68   07/17/17 123/70   05/12/17 124/78    Wt Readings from Last 3 Encounters:   10/27/17 166 lb 6.4 oz (75.5 kg)   07/17/17 155 lb (70.3 kg)   05/12/17 159 lb 3.2 oz (72.2 kg)                        Reviewed and updated as needed this visit by clinical staff     Reviewed and updated as needed this visit by Provider         ROS:  Constitutional, HEENT, cardiovascular, pulmonary, gi and gu systems are negative, except as otherwise noted.      OBJECTIVE:   /68  Pulse 80  Temp 97.6  F (36.4  C) (Tympanic)  Ht 4' 9.5\" (1.461 m)  Wt 166 lb 6.4 oz (75.5 kg)  LMP 10/26/2017 (Exact Date)  BMI 35.39 kg/m2  Body mass index is 35.39 kg/(m^2).  GENERAL: healthy, alert and no distress  Skin: symmetric, erythematous vesicular rash noted on palms of fingers hand, no rash on dorsum or web spaces.  Scaling fissures and lichenification noted.  Purulent drainage and discharge noted from vesicles  Psychological:  Alert and Oriented x 3.  Mood and affect appropriate.     Diagnostic Test Results:  none     ASSESSMENT/PLAN:         1. Eczema, unspecified type with secondary bacterial infection.    Will start a steroid cream today (kenalog cream), see epic for orders.  Continue to use lubricating cream (ie Cetaphil), especially after bath to trap moisture in skin.  May use anti-histamine for pruritus (hydroxyzine), which may cause drowsiness but should also help her anxiety.     Patient is to f/u with derm in about 1 week (appt is scheduled)    Patient was instructed to f/u if symptoms worsen or fail to improve as anticipated.            - DERMATOLOGY REFERRAL  - triamcinolone (KENALOG) 0.1 % cream; Apply sparingly to affected area two times daily for 10- 14 days.  Dispense: 30 g; Refill: 0    2. " Cellulitis of finger, unspecified laterality  Culture taken from weeping rash.  Will start an antibiotic   - DERMATOLOGY REFERRAL  - Wound Culture Aerobic Bacterial  - Gram stain  - sulfamethoxazole-trimethoprim (BACTRIM DS) 800-160 MG per tablet; Take 1 tablet by mouth 2 times daily for 10 days  Dispense: 20 tablet; Refill: 0    3. MAURI (generalized anxiety disorder)  Anxiety    I discussed the potential side effects of antianxiety medications as well as the likelihood of worsening before improvement over the next few weeks. I reemphasized the importance of a multi-armed approach towards the treatment of anxiety including regular exercise, adequate sleep, and a well rounded, low-glycemic diet.  In addition, I emphasized the importance of ongoing development of her support network. If new or worsening symptoms, she will seek help immediately.          - escitalopram (LEXAPRO) 10 MG tablet; Take 1 tablet (10 mg) by mouth daily  Dispense: 30 tablet; Refill: 0  - hydrOXYzine (ATARAX) 25 MG tablet; Take 1-2 tablets (25-50 mg) by mouth every 6 hours as needed for anxiety  Dispense: 45 tablet; Refill: 1    4. Gastroesophageal reflux disease with esophagitis  We discussed long term use of PPIs in detail, patient understands risks associated with long term use which include C diff associated diarrhea, atrophic gastritis, fractures, B 12 deficiency and renal impairment      Will try switching to zantac.    - ranitidine (ZANTAC) 300 MG tablet; Take 1 tablet (300 mg) by mouth At Bedtime  Dispense: 30 tablet; Refill: 1    FUTURE APPOINTMENTS:       - Follow-up visit in 1 month.     Magaly Simon PA-C  [facility]

## 2017-10-27 NOTE — NURSING NOTE
"Chief Complaint   Patient presents with     Eczema     Anxiety     Abdominal Pain       Initial /68  Pulse 80  Temp 97.6  F (36.4  C) (Tympanic)  Ht 4' 9.5\" (1.461 m)  Wt 166 lb 6.4 oz (75.5 kg)  LMP 10/26/2017 (Exact Date)  BMI 35.39 kg/m2 Estimated body mass index is 35.39 kg/(m^2) as calculated from the following:    Height as of this encounter: 4' 9.5\" (1.461 m).    Weight as of this encounter: 166 lb 6.4 oz (75.5 kg).  Medication Reconciliation: complete     SMA Yolanda      "

## 2017-10-27 NOTE — MR AVS SNAPSHOT
After Visit Summary   10/27/2017    Emily Moreira    MRN: [id number]           Patient Information     Date Of Birth          1985        Visit Information        Provider Department      10/27/2017 3:20 PM Magaly Simon PA-C [facility]        Today's Diagnoses     Eczema, unspecified type    -  1    Cellulitis of finger, unspecified laterality           Follow-ups after your visit        Additional Services     DERMATOLOGY REFERRAL       Your provider has referred you to: Derm of her choice    Please be aware that coverage of these services is subject to the terms and limitations of your health insurance plan.  Call member services at your health plan with any benefit or coverage questions.      Please bring the following with you to your appointment:    (1) Any X-Rays, CTs or MRIs which have been performed.  Contact the facility where they were done to arrange for  prior to your scheduled appointment.    (2) List of current medications  (3) This referral request   (4) Any documents/labs given to you for this referral                  Who to contact     Normal or non-critical lab and imaging results will be communicated to you by [facility] [employer], letter or phone within 4 business days after the clinic has received the results. If you do not hear from us within 7 days, please contact the clinic through [employer] or phone. If you have a critical or abnormal lab result, we will notify you by phone as soon as possible.  Submit refill requests through [employer] or call your pharmacy and they will forward the refill request to us. Please allow 3 business days for your refill to be completed.          If you need to speak with a  for additional information , please call: [phone]           Additional Information About Your Visit        [employer] Information     [employer] lets you send messages to your doctor, view your test results, renew your prescriptions,  "schedule appointments and more. To sign up, go to [email or web address] . Click on \"Log in\" on the left side of the screen, which will take you to the Welcome page. Then click on \"Sign up Now\" on the right side of the page.     You will be asked to enter the access code listed below, as well as some personal information. Please follow the directions to create your username and password.     Your access code is: [email or web address]  Expires: 2018  4:01 PM     Your access code will  in 90 days. If you need help or a new code, please call your [place] clinic or [phone].        Care EveryWhere ID     This is your Care EveryWhere ID. This could be used by other organizations to access your [place] medical records  [id number]        Your Vitals Were     Pulse Temperature Height Last Period BMI (Body Mass Index)       80 97.6  F (36.4  C) (Tympanic) 4' 9.5\" (1.461 m) 10/26/2017 (Exact Date) 35.39 kg/m2        Blood Pressure from Last 3 Encounters:   10/27/17 124/68   17 123/70   17 124/78    Weight from Last 3 Encounters:   10/27/17 166 lb 6.4 oz (75.5 kg)   17 155 lb (70.3 kg)   17 159 lb 3.2 oz (72.2 kg)              We Performed the Following     DERMATOLOGY REFERRAL     Gram stain     Wound Culture Aerobic Bacterial          Today's Medication Changes          These changes are accurate as of: 10/27/17  4:01 PM.  If you have any questions, ask your nurse or doctor.               Start taking these medicines.        Dose/Directions    sulfamethoxazole-trimethoprim 800-160 MG per tablet   Commonly known as:  BACTRIM DS   Used for:  Cellulitis of finger, unspecified laterality   Started by:  Magaly Simon PA-C        Dose:  1 tablet   Take 1 tablet by mouth 2 times daily for 10 days   Quantity:  20 tablet   Refills:  0       triamcinolone 0.1 % cream   Commonly known as:  KENALOG   Used for:  Eczema, unspecified type   Started by:  Magaly Simon PA-C        Apply " sparingly to affected area two times daily for 10- 14 days.   Quantity:  30 g   Refills:  0            Where to get your medicines      These medications were sent to [facility] - [place], [place] - [address]  [address], [facility] [address]     Phone:  [phone]     sulfamethoxazole-trimethoprim 800-160 MG per tablet    triamcinolone 0.1 % cream                Primary Care Provider Office Phone # Fax #    Prachi Flores PA-C [phone]       XXX RESIGNED XXX [address] [address]        Equal Access to Services     [facility] : Hadii aad ku hadasho Soomaali, waaxda luqadaha, qaybta kaalmada adeegyada, harriet swenson . So [facility] [phone].    ATENCIÓN: Si habla español, tiene a allen disposición servicios gratuitos de asistencia lingüística. Ll[facility] [phone].    We comply with applicable federal civil rights laws and [place] laws. We do not discriminate on the basis of race, color, national origin, age, disability, sex, sexual orientation, or gender identity.            Thank you!     Thank you for choosing [facility]  for your care. Our goal is always to provide you with excellent care. Hearing back from our patients is one way we can continue to improve our services. Please take a few minutes to complete the written survey that you may receive in the mail after your visit with us. Thank you!             Your Updated Medication List - Protect others around you: Learn how to safely use, store and throw away your medicines at www.disposemymeds.org.          This list is accurate as of: 10/27/17  4:01 PM.  Always use your most recent med list.                   Brand Name Dispense Instructions for use Diagnosis    * albuterol (2.5 MG/3ML) 0.083% neb solution     75 mL    Take 1 vial (2.5 mg) by nebulization every 6 hours as needed for shortness of breath / dyspnea or wheezing        * albuterol  108 (90 BASE) MCG/ACT Inhaler    PROAIR HFA/PROVENTIL HFA/VENTOLIN HFA    1 Inhaler    Inhale 2 puffs into the lungs every 6 hours as needed for shortness of breath / dyspnea or wheezing    Mild intermittent asthma with acute exacerbation       cyclobenzaprine 10 MG tablet    FLEXERIL    14 tablet    Take 1 tablet (10 mg) by mouth nightly as needed for muscle spasms    Chronic tension-type headache, not intractable       ibuprofen 400 MG tablet    ADVIL/MOTRIN    120 tablet    Take 1-2 tablets (400-800 mg) by mouth every 6 hours as needed for other (cramping)     (normal spontaneous vaginal delivery)       metoclopramide 10 MG tablet    REGLAN    60 tablet    Take 1 tablet (10 mg) by mouth 4 times daily as needed    Nausea, Gastroesophageal reflux disease with esophagitis       norgestimate-ethinyl estradiol 0.25-35 MG-MCG per tablet    ORTHO-CYCLEN, SPRINTEC    84 tablet    Take 1 tablet by mouth daily    Encounter for surveillance of contraceptive pills       omeprazole 20 MG CR capsule    priLOSEC    30 capsule    Take 1 capsule (20 mg) by mouth daily as needed Needs to be seen by provider for further refills    Acute gastritis without hemorrhage, unspecified gastritis type       oxyCODONE-acetaminophen 5-325 MG per tablet    PERCOCET    5 tablet    Take 1 tablet by mouth every 4 hours as needed for pain    Migraine without aura and without status migrainosus, not intractable       sucralfate 1 GM tablet    CARAFATE    42 tablet    Take 1 tablet (1 g) by mouth 4 times daily    Erosive gastritis       sulfamethoxazole-trimethoprim 800-160 MG per tablet    BACTRIM DS    20 tablet    Take 1 tablet by mouth 2 times daily for 10 days    Cellulitis of finger, unspecified laterality       SUMAtriptan 25 MG tablet    IMITREX    9 tablet    Take 1-2 tablets (25-50 mg) by mouth at onset of headache for migraine May repeat dose in 2 hours.  Do not exceed 200 mg in 24 hours    Migraine without aura and without status  migrainosus, not intractable       traMADol 50 MG tablet    ULTRAM    20 tablet    Take 1-2 tablets ( mg) by mouth every 6 hours as needed for pain        triamcinolone 0.1 % cream    KENALOG    30 g    Apply sparingly to affected area two times daily for 10- 14 days.    Eczema, unspecified type       * Notice:  This list has 2 medication(s) that are the same as other medications prescribed for you. Read the directions carefully, and ask your doctor or other care provider to review them with you.

## 2017-10-28 LAB
GRAM STN SPEC: ABNORMAL
GRAM STN SPEC: ABNORMAL
Lab: ABNORMAL
SPECIMEN SOURCE: ABNORMAL

## 2017-10-30 LAB
BACTERIA SPEC CULT: ABNORMAL
Lab: ABNORMAL
SPECIMEN SOURCE: ABNORMAL

## 2017-11-29 ENCOUNTER — TELEPHONE (OUTPATIENT)
Dept: FAMILY MEDICINE | Facility: CLINIC | Age: 32
End: 2017-11-29

## 2017-11-29 DIAGNOSIS — L30.9 ECZEMA, UNSPECIFIED TYPE: ICD-10-CM

## 2017-11-29 DIAGNOSIS — L03.019 CELLULITIS OF FINGER, UNSPECIFIED LATERALITY: ICD-10-CM

## 2017-11-29 RX ORDER — TRIAMCINOLONE ACETONIDE 1 MG/G
CREAM TOPICAL
Qty: 30 G | Refills: 1 | Status: SHIPPED | OUTPATIENT
Start: 2017-11-29

## 2017-11-29 RX ORDER — SULFAMETHOXAZOLE/TRIMETHOPRIM 800-160 MG
1 TABLET ORAL 2 TIMES DAILY
Qty: 20 TABLET | Refills: 0 | Status: SHIPPED | OUTPATIENT
Start: 2017-11-29 | End: 2017-12-20

## 2017-11-29 NOTE — TELEPHONE ENCOUNTER
Left message at patients home to return call to clinic.  Do you want to see her again. Genesis Ramírez RN

## 2017-11-29 NOTE — TELEPHONE ENCOUNTER
Pt left msg on VM, that she is looking for medication for her hands because they have started to break out and are weeping again, looking for steroid and bactrum, please triage.     Sandra Sousa, [place]

## 2017-11-29 NOTE — TELEPHONE ENCOUNTER
Please call Emily. Refilled both scripts.  She should schedule a f/u visit if she feels she needs a 3rd round of antibiotics or if she needs that filled again in the future.  I put an additional refill on the kenalog.  Remember to moisturize frequently to stay ahead of the eczema.   Magaly Simon PA-C

## 2017-11-29 NOTE — TELEPHONE ENCOUNTER
"I spoke with Emily. She lost the tube of TMC cream in her move and it was working great. Now her ands are weeping again and she would like this reordered along with the antibiotic because she has MERSA and has a child she does not want to infect.    She said she went to the dermatologist and they prescribed Eucrisa which made her eczema much worse. States, \"They gave me samples but I was going to pick it up and it is 800 dollars a tube and my insurance will not cover it. The cream and antibiotic Magaly gave me worked great. It's just that I lost it.\"    Patient denies fever or increased redness. Says this is her typical eczema. She would like the prescriptions sent to our pharmacy here. Genesis Ramírez RN    "

## 2017-12-20 ENCOUNTER — APPOINTMENT (OUTPATIENT)
Dept: GENERAL RADIOLOGY | Facility: CLINIC | Age: 32
End: 2017-12-20
Attending: NURSE PRACTITIONER
Payer: COMMERCIAL

## 2017-12-20 ENCOUNTER — HOSPITAL ENCOUNTER (EMERGENCY)
Facility: CLINIC | Age: 32
Discharge: HOME | End: 2017-12-20
Attending: NURSE PRACTITIONER | Admitting: NURSE PRACTITIONER
Payer: COMMERCIAL

## 2017-12-20 VITALS
RESPIRATION RATE: 16 BRPM | HEART RATE: 83 BPM | OXYGEN SATURATION: 98 % | BODY MASS INDEX: 32.96 KG/M2 | SYSTOLIC BLOOD PRESSURE: 122 MMHG | TEMPERATURE: 97.7 F | DIASTOLIC BLOOD PRESSURE: 66 MMHG | WEIGHT: 155 LBS

## 2017-12-20 DIAGNOSIS — R31.21 ASYMPTOMATIC MICROSCOPIC HEMATURIA: ICD-10-CM

## 2017-12-20 DIAGNOSIS — M79.10 MYALGIA: ICD-10-CM

## 2017-12-20 LAB
ALBUMIN SERPL-MCNC: 3.4 G/DL (ref 3.4–5)
ALBUMIN UR-MCNC: 10 MG/DL
ALP SERPL-CCNC: 62 U/L (ref 40–150)
ALT SERPL W P-5'-P-CCNC: 22 U/L (ref 0–50)
AMPHETAMINES UR QL SCN: NEGATIVE
ANION GAP SERPL CALCULATED.3IONS-SCNC: 6 MMOL/L (ref 3–14)
APPEARANCE UR: CLEAR
AST SERPL W P-5'-P-CCNC: 20 U/L (ref 0–45)
BARBITURATES UR QL: NEGATIVE
BASOPHILS # BLD AUTO: 0 10E9/L (ref 0–0.2)
BASOPHILS NFR BLD AUTO: 0.2 %
BENZODIAZ UR QL: NEGATIVE
BILIRUB SERPL-MCNC: 0.2 MG/DL (ref 0.2–1.3)
BILIRUB UR QL STRIP: NEGATIVE
BUN SERPL-MCNC: 14 MG/DL (ref 7–30)
CALCIUM SERPL-MCNC: 8.3 MG/DL (ref 8.5–10.1)
CANNABINOIDS UR QL SCN: POSITIVE
CHLORIDE SERPL-SCNC: 108 MMOL/L (ref 94–109)
CK SERPL-CCNC: 100 U/L (ref 30–225)
CO2 SERPL-SCNC: 24 MMOL/L (ref 20–32)
COCAINE UR QL: NEGATIVE
COLOR UR AUTO: YELLOW
CREAT SERPL-MCNC: 0.67 MG/DL (ref 0.52–1.04)
CRP SERPL-MCNC: 22.3 MG/L (ref 0–8)
DEPRECATED S PYO AG THROAT QL EIA: NORMAL
DIFFERENTIAL METHOD BLD: NORMAL
EOSINOPHIL # BLD AUTO: 0.1 10E9/L (ref 0–0.7)
EOSINOPHIL NFR BLD AUTO: 1.6 %
ERYTHROCYTE [DISTWIDTH] IN BLOOD BY AUTOMATED COUNT: 11.5 % (ref 10–15)
ERYTHROCYTE [SEDIMENTATION RATE] IN BLOOD BY WESTERGREN METHOD: 28 MM/H (ref 0–20)
FLUAV+FLUBV AG SPEC QL: NEGATIVE
FLUAV+FLUBV AG SPEC QL: NEGATIVE
GFR SERPL CREATININE-BSD FRML MDRD: >90 ML/MIN/1.7M2
GLUCOSE SERPL-MCNC: 103 MG/DL (ref 70–99)
GLUCOSE UR STRIP-MCNC: NEGATIVE MG/DL
HCG UR QL: NEGATIVE
HCT VFR BLD AUTO: 38.6 % (ref 35–47)
HGB BLD-MCNC: 13.1 G/DL (ref 11.7–15.7)
HGB UR QL STRIP: NEGATIVE
IMM GRANULOCYTES # BLD: 0 10E9/L (ref 0–0.4)
IMM GRANULOCYTES NFR BLD: 0 %
KETONES UR STRIP-MCNC: NEGATIVE MG/DL
LEUKOCYTE ESTERASE UR QL STRIP: NEGATIVE
LYMPHOCYTES # BLD AUTO: 0.8 10E9/L (ref 0.8–5.3)
LYMPHOCYTES NFR BLD AUTO: 17.8 %
MCH RBC QN AUTO: 30.7 PG (ref 26.5–33)
MCHC RBC AUTO-ENTMCNC: 33.9 G/DL (ref 31.5–36.5)
MCV RBC AUTO: 90 FL (ref 78–100)
MONOCYTES # BLD AUTO: 0.6 10E9/L (ref 0–1.3)
MONOCYTES NFR BLD AUTO: 13.5 %
MUCOUS THREADS #/AREA URNS LPF: PRESENT /LPF
NEUTROPHILS # BLD AUTO: 3 10E9/L (ref 1.6–8.3)
NEUTROPHILS NFR BLD AUTO: 66.9 %
NITRATE UR QL: NEGATIVE
OPIATES UR QL SCN: NEGATIVE
PCP UR QL SCN: NEGATIVE
PH UR STRIP: 6 PH (ref 5–7)
PLATELET # BLD AUTO: 213 10E9/L (ref 150–450)
POTASSIUM SERPL-SCNC: 3.6 MMOL/L (ref 3.4–5.3)
PROT SERPL-MCNC: 7.6 G/DL (ref 6.8–8.8)
RBC # BLD AUTO: 4.27 10E12/L (ref 3.8–5.2)
RBC #/AREA URNS AUTO: 9 /HPF (ref 0–2)
SODIUM SERPL-SCNC: 138 MMOL/L (ref 133–144)
SOURCE: ABNORMAL
SP GR UR STRIP: 1.02 (ref 1–1.03)
SPECIMEN SOURCE: NORMAL
SPECIMEN SOURCE: NORMAL
SQUAMOUS #/AREA URNS AUTO: <1 /HPF (ref 0–1)
UROBILINOGEN UR STRIP-MCNC: NORMAL MG/DL (ref 0–2)
WBC # BLD AUTO: 4.4 10E9/L (ref 4–11)
WBC #/AREA URNS AUTO: 1 /HPF (ref 0–2)

## 2017-12-20 PROCEDURE — 80053 COMPREHEN METABOLIC PANEL: CPT | Performed by: NURSE PRACTITIONER

## 2017-12-20 PROCEDURE — 86140 C-REACTIVE PROTEIN: CPT | Performed by: NURSE PRACTITIONER

## 2017-12-20 PROCEDURE — 99285 EMERGENCY DEPT VISIT HI MDM: CPT | Mod: 25 | Performed by: NURSE PRACTITIONER

## 2017-12-20 PROCEDURE — 81025 URINE PREGNANCY TEST: CPT | Performed by: NURSE PRACTITIONER

## 2017-12-20 PROCEDURE — 71020 XR CHEST 2 VW: CPT

## 2017-12-20 PROCEDURE — 85652 RBC SED RATE AUTOMATED: CPT | Performed by: NURSE PRACTITIONER

## 2017-12-20 PROCEDURE — 87880 STREP A ASSAY W/OPTIC: CPT | Performed by: NURSE PRACTITIONER

## 2017-12-20 PROCEDURE — 96374 THER/PROPH/DIAG INJ IV PUSH: CPT | Performed by: NURSE PRACTITIONER

## 2017-12-20 PROCEDURE — 85025 COMPLETE CBC W/AUTO DIFF WBC: CPT | Performed by: NURSE PRACTITIONER

## 2017-12-20 PROCEDURE — 82550 ASSAY OF CK (CPK): CPT | Performed by: NURSE PRACTITIONER

## 2017-12-20 PROCEDURE — 87081 CULTURE SCREEN ONLY: CPT | Performed by: NURSE PRACTITIONER

## 2017-12-20 PROCEDURE — 87804 INFLUENZA ASSAY W/OPTIC: CPT | Performed by: NURSE PRACTITIONER

## 2017-12-20 PROCEDURE — 81001 URINALYSIS AUTO W/SCOPE: CPT | Performed by: NURSE PRACTITIONER

## 2017-12-20 PROCEDURE — 25000128 H RX IP 250 OP 636: Performed by: NURSE PRACTITIONER

## 2017-12-20 PROCEDURE — 80307 DRUG TEST PRSMV CHEM ANLYZR: CPT | Performed by: NURSE PRACTITIONER

## 2017-12-20 PROCEDURE — 99285 EMERGENCY DEPT VISIT HI MDM: CPT | Mod: Z6 | Performed by: NURSE PRACTITIONER

## 2017-12-20 PROCEDURE — 96375 TX/PRO/DX INJ NEW DRUG ADDON: CPT | Performed by: NURSE PRACTITIONER

## 2017-12-20 PROCEDURE — 96361 HYDRATE IV INFUSION ADD-ON: CPT | Performed by: NURSE PRACTITIONER

## 2017-12-20 PROCEDURE — 86618 LYME DISEASE ANTIBODY: CPT | Performed by: NURSE PRACTITIONER

## 2017-12-20 RX ORDER — HYDROMORPHONE HYDROCHLORIDE 1 MG/ML
0.5 INJECTION, SOLUTION INTRAMUSCULAR; INTRAVENOUS; SUBCUTANEOUS ONCE
Status: COMPLETED | OUTPATIENT
Start: 2017-12-20 | End: 2017-12-20

## 2017-12-20 RX ORDER — TRAMADOL HYDROCHLORIDE 50 MG/1
50-100 TABLET ORAL EVERY 6 HOURS PRN
Qty: 20 TABLET | Refills: 0 | Status: SHIPPED | OUTPATIENT
Start: 2017-12-20

## 2017-12-20 RX ORDER — CYCLOBENZAPRINE HCL 10 MG
10 TABLET ORAL 3 TIMES DAILY PRN
Qty: 21 TABLET | Refills: 0 | Status: SHIPPED | OUTPATIENT
Start: 2017-12-20

## 2017-12-20 RX ORDER — KETOROLAC TROMETHAMINE 30 MG/ML
30 INJECTION, SOLUTION INTRAMUSCULAR; INTRAVENOUS ONCE
Status: DISCONTINUED | OUTPATIENT
Start: 2017-12-20 | End: 2017-12-20

## 2017-12-20 RX ADMIN — Medication 0.5 MG: at 13:52

## 2017-12-20 RX ADMIN — KETOROLAC TROMETHAMINE 30 MG: 30 INJECTION, SOLUTION INTRAMUSCULAR at 13:09

## 2017-12-20 RX ADMIN — SODIUM CHLORIDE 1000 ML: 9 INJECTION, SOLUTION INTRAVENOUS at 13:09

## 2017-12-20 ASSESSMENT — ENCOUNTER SYMPTOMS
ARTHRALGIAS: 0
POLYDIPSIA: 0
FREQUENCY: 0
NAUSEA: 0
DIZZINESS: 1
UNEXPECTED WEIGHT CHANGE: 0
WEAKNESS: 0
COUGH: 0
DYSURIA: 0
CHEST TIGHTNESS: 0
JOINT SWELLING: 0
MYALGIAS: 1
SORE THROAT: 0
EYE PAIN: 0
BACK PAIN: 1
BLOOD IN STOOL: 0
LIGHT-HEADEDNESS: 0
HEADACHES: 0
RHINORRHEA: 0
FATIGUE: 0
PALPITATIONS: 0
FLANK PAIN: 0
APPETITE CHANGE: 0
WHEEZING: 0
SHORTNESS OF BREATH: 0
DIARRHEA: 0
ABDOMINAL PAIN: 0
CHILLS: 1
FEVER: 0
CONSTIPATION: 0
VOMITING: 0

## 2017-12-20 NOTE — ED AVS SNAPSHOT
[facility]    [address] [address]    Phone:  [phone]    Fax:  [phone]                                       Emily Moreira   MRN: [id number]    Department:  [facility]   Date of Visit:  12/20/2017           After Visit Summary Signature Page     I have received my discharge instructions, and my questions have been answered. I have discussed any challenges I see with this plan with the nurse or doctor.    ..........................................................................................................................................  Patient/Patient Representative Signature      ..........................................................................................................................................  Patient Representative Print Name and Relationship to Patient    ..................................................               ................................................  Date                                            Time    ..........................................................................................................................................  Reviewed by Signature/Title    ...................................................              ..............................................  Date                                                            Time

## 2017-12-20 NOTE — ED PROVIDER NOTES
"  History     Chief Complaint   Patient presents with     Generalized Body Aches     started Monday night and increasing worse with headaches.  Denies fevers, chest pain, abdominal pain.     HPI  Emily Moreira is a 32 year old female who presents to the emergency department for evaluation of myalgia.  Symptoms started 2 days ago (during the night-Tuesday morning) and is gradually worsening. Patient states she feels like she is a \"walking bruise\", like she was \"hit by a damn bus\".  Pain in her muscles is worse than when she had back labor.  No objective fever, but does reports chills. Denies abdominal or flank pain. Denies urinary symptoms. Denies respiratory symptoms. No recent travel or known ill contacts.  Treating symptoms with Ibuprofen and muscle relaxer last too at 8am.    PMH: includes anxiety, gastritis, asthma, eczema, migraines, and  Atopic dermatitis.  Meds: oral birth control, omeprazole (being changed to a different med, doesn't know name-?Zantac), muscle relaxer (?flexeril), and Ibuprofen.  Social Hx: Current every day smoker, uses marijuana regularly. Denies recent Etoh use. Denies any other illicit drugs.     Problem List:    Patient Active Problem List    Diagnosis Date Noted     Anxiety 03/14/2016     Priority: Medium     Gastritis 03/01/2016     Priority: Medium     With esophagitis (seen on EGD)       Asthma 06/19/2015     Priority: Medium     Atopic dermatitis and related condition 06/19/2015     Priority: Medium     Drug dependence, in remission (H) 06/19/2015     Priority: Medium     Overview:   Meth       Migraine 06/19/2015     Priority: Medium     Kidney stones 05/13/2015     Priority: Medium     Saw Dr. Martin       Mild intermittent asthma 01/26/2015     Priority: Medium     Supervision of normal first pregnancy 01/19/2015     Priority: Medium     EDC confirmed by sono at 6f9b--7/1/15  Verifi negative, male XY  Normal anatomy US  TDAP given 6/8/15       Tobacco use disorder " 10/11/2011     Priority: Medium        Past Medical History:    Past Medical History:   Diagnosis Date     Chickenpox      GERD (gastroesophageal reflux disease)      Kidney stones      Urinary tract bacterial infections        Past Surgical History:    Past Surgical History:   Procedure Laterality Date     HC ASPIRATION &/OR INJECTION GANGLION CYST, ANY      left foot     MOUTH SURGERY      wisdom teeth       Family History:    Family History   Problem Relation Age of Onset     Alcohol/Drug Mother      alcohol     DIABETES Maternal Grandfather      HEART DISEASE Maternal Grandfather      bypass/MI     Hypertension Maternal Grandfather      DIABETES Paternal Grandmother      HEART DISEASE Paternal Grandmother      Obesity Paternal Grandmother      Hypertension Paternal Grandmother      Cardiovascular Paternal Grandfather      MI       Social History:  Marital Status:  Single [1]  Social History   Substance Use Topics     Smoking status: Current Every Day Smoker     Packs/day: 1.00     Types: Cigarettes     Smokeless tobacco: Never Used     Alcohol use Yes      Comment: ocasional        Medications:      traMADol (ULTRAM) 50 MG tablet   cyclobenzaprine (FLEXERIL) 10 MG tablet   triamcinolone (KENALOG) 0.1 % cream   escitalopram (LEXAPRO) 10 MG tablet   metoclopramide (REGLAN) 10 MG tablet   albuterol (PROAIR HFA/PROVENTIL HFA/VENTOLIN HFA) 108 (90 BASE) MCG/ACT Inhaler   norgestimate-ethinyl estradiol (ORTHO-CYCLEN, SPRINTEC) 0.25-35 MG-MCG per tablet   cyclobenzaprine (FLEXERIL) 10 MG tablet   ibuprofen (ADVIL,MOTRIN) 400 MG tablet   hydrOXYzine (ATARAX) 25 MG tablet   ranitidine (ZANTAC) 300 MG tablet   omeprazole (PRILOSEC) 20 MG CR capsule   sucralfate (CARAFATE) 1 GM tablet   [DISCONTINUED] albuterol (2.5 MG/3ML) 0.083% nebulizer solution   SUMAtriptan (IMITREX) 25 MG tablet         Review of Systems   Constitutional: Positive for chills. Negative for appetite change, fatigue, fever and unexpected weight change.    HENT: Positive for ear pain (right ear pain). Negative for congestion, rhinorrhea and sore throat.    Eyes: Negative for pain.   Respiratory: Negative for cough, chest tightness, shortness of breath and wheezing.    Cardiovascular: Negative for chest pain, palpitations and leg swelling.   Gastrointestinal: Negative for abdominal pain, blood in stool, constipation, diarrhea, nausea and vomiting.   Endocrine: Negative for polydipsia.   Genitourinary: Negative for decreased urine volume, dysuria, flank pain, frequency, pelvic pain, urgency, vaginal bleeding, vaginal discharge and vaginal pain.   Musculoskeletal: Positive for back pain (uper back, base of neck-back region) and myalgias. Negative for arthralgias and joint swelling.   Skin: Positive for rash (bilateral hands (eczema)).   Neurological: Positive for dizziness (when pain is bad). Negative for weakness, light-headedness and headaches.       Physical Exam   BP: (!) 140/103  Pulse: 78  Heart Rate: 125  Temp: 97.7  F (36.5  C)  Resp: 20  Weight: 70.3 kg (155 lb)  SpO2: 100 %      Physical Exam   Constitutional: She is oriented to person, place, and time. She appears well-developed and well-nourished. She appears distressed (tearful).   HENT:   Head: Normocephalic and atraumatic.   Right Ear: External ear normal.   Left Ear: External ear normal.   Nose: Nose normal.   Mouth/Throat: Oropharynx is clear and moist.   Eyes: Conjunctivae and EOM are normal.   Neck: Normal range of motion. Neck supple.   Cardiovascular: Regular rhythm and normal heart sounds.  Tachycardia present.    No murmur heard.  Pulmonary/Chest: Effort normal and breath sounds normal. No respiratory distress.   Abdominal: Soft. Bowel sounds are normal. There is no tenderness.   Lymphadenopathy:     She has no cervical adenopathy.   Neurological: She is alert and oriented to person, place, and time.   Skin: Skin is warm and dry.       ED Course     ED Course     Procedures              Results  for orders placed or performed during the hospital encounter of 12/20/17 (from the past 48 hour(s))   Comprehensive metabolic panel   Result Value Ref Range    Sodium 138 133 - 144 mmol/L    Potassium 3.6 3.4 - 5.3 mmol/L    Chloride 108 94 - 109 mmol/L    Carbon Dioxide 24 20 - 32 mmol/L    Anion Gap 6 3 - 14 mmol/L    Glucose 103 (H) 70 - 99 mg/dL    Urea Nitrogen 14 7 - 30 mg/dL    Creatinine 0.67 0.52 - 1.04 mg/dL    GFR Estimate >90 >60 mL/min/1.7m2    GFR Estimate If Black >90 >60 mL/min/1.7m2    Calcium 8.3 (L) 8.5 - 10.1 mg/dL    Bilirubin Total 0.2 0.2 - 1.3 mg/dL    Albumin 3.4 3.4 - 5.0 g/dL    Protein Total 7.6 6.8 - 8.8 g/dL    Alkaline Phosphatase 62 40 - 150 U/L    ALT 22 0 - 50 U/L    AST 20 0 - 45 U/L   CBC with platelets differential   Result Value Ref Range    WBC 4.4 4.0 - 11.0 10e9/L    RBC Count 4.27 3.8 - 5.2 10e12/L    Hemoglobin 13.1 11.7 - 15.7 g/dL    Hematocrit 38.6 35.0 - 47.0 %    MCV 90 78 - 100 fl    MCH 30.7 26.5 - 33.0 pg    MCHC 33.9 31.5 - 36.5 g/dL    RDW 11.5 10.0 - 15.0 %    Platelet Count 213 150 - 450 10e9/L    Diff Method Automated Method     % Neutrophils 66.9 %    % Lymphocytes 17.8 %    % Monocytes 13.5 %    % Eosinophils 1.6 %    % Basophils 0.2 %    % Immature Granulocytes 0.0 %    Absolute Neutrophil 3.0 1.6 - 8.3 10e9/L    Absolute Lymphocytes 0.8 0.8 - 5.3 10e9/L    Absolute Monocytes 0.6 0.0 - 1.3 10e9/L    Absolute Eosinophils 0.1 0.0 - 0.7 10e9/L    Absolute Basophils 0.0 0.0 - 0.2 10e9/L    Abs Immature Granulocytes 0.0 0 - 0.4 10e9/L   CRP inflammation   Result Value Ref Range    CRP Inflammation 22.3 (H) 0.0 - 8.0 mg/L   Erythrocyte sedimentation rate auto   Result Value Ref Range    Sed Rate 28 (H) 0 - 20 mm/h   CK total   Result Value Ref Range    CK Total 100 30 - 225 U/L   Influenza A/B antigen   Result Value Ref Range    Influenza A/B Agn Specimen Nasal     Influenza A Negative NEG^Negative    Influenza B Negative NEG^Negative   Rapid Strep Screen   Result  Value Ref Range    Specimen Description Throat     Rapid Strep A Screen       NEGATIVE: No Group A streptococcal antigen detected by immunoassay, await culture report.   UA with Microscopic   Result Value Ref Range    Color Urine Yellow     Appearance Urine Clear     Glucose Urine Negative NEG^Negative mg/dL    Bilirubin Urine Negative NEG^Negative    Ketones Urine Negative NEG^Negative mg/dL    Specific Gravity Urine 1.016 1.003 - 1.035    Blood Urine Negative NEG^Negative    pH Urine 6.0 5.0 - 7.0 pH    Protein Albumin Urine 10 (A) NEG^Negative mg/dL    Urobilinogen mg/dL Normal 0.0 - 2.0 mg/dL    Nitrite Urine Negative NEG^Negative    Leukocyte Esterase Urine Negative NEG^Negative    Source Midstream Urine     WBC Urine 1 0 - 2 /HPF    RBC Urine 9 (H) 0 - 2 /HPF    Squamous Epithelial /HPF Urine <1 0 - 1 /HPF    Mucous Urine Present (A) NEG^Negative /LPF   HCG qualitative urine (UPT)   Result Value Ref Range    HCG Qual Urine Negative NEG^Negative   Drug abuse screen 77 urine ([place],[facility])   Result Value Ref Range    Amphetamine Qual Urine Negative NEG^Negative    Barbiturates Qual Urine Negative NEG^Negative    Benzodiazepine Qual Urine Negative NEG^Negative    Cannabinoids Qual Urine Positive (A) NEG^Negative    Cocaine Qual Urine Negative NEG^Negative    Opiates Qualitative Urine Negative NEG^Negative    PCP Qual Urine Negative NEG^Negative   XR Chest 2 Views    Narrative    XR CHEST 2 VW 12/20/2017 2:08 PM    COMPARISON: 7/17/2017.    HISTORY: Body aches.      Impression    IMPRESSION: Cardiac silhouette and pulmonary vasculature are within  normal limits. No focal airspace disease, pleural effusion or  pneumothorax.     DOI ENRIQUEZ MD       13:45: Reassessed. No relief of body aches with Toradol.  Patient informed of reassuring labs. CK and Urine still pending. Dilaudid ordered.  14:20 Patient reports improved pain after dilaudid     Assessments & Plan (with Medical Decision Making)   DDx: Strep pharyngitis,  "influenza, pneumonia, Rheumatologic disorder, Lyme's Disease    Emily Moreira is a 32 year old female who presents to the emergency department for evaluation of myalgia.  Symptoms started 2 days ago (during the night-Tuesday morning) and is gradually worsening. Patient states she feels like she is a \"walking bruise\", like she was \"hit by a damn bus\".  Pain in her muscles is worse than when she had back labor.  No objective fever, but does reports chills. Denies abdominal or flank pain. Denies urinary symptoms. Denies respiratory symptoms. No recent travel or known ill contacts.  Treating symptoms with Ibuprofen and muscle relaxer last too at 8am.  Patient is an every day smoker. Reports marijuana use regularly.  Denies recent EToh use- and rare. Denies illicit drugs.    On exam patient tearful and appears distressed.  Tender with palpation to arms, legs and top of feet. No joint pain or swelling. No skin rash. Lung sounds CTA. Remainder of exam as noted above and is unremarkable. CBC is normal. CMP is normal. Urinalysis is negative for infection. There is 9 RBCs in the urine.  CRP is elevated at 22.3. ESR is elevated at 28.  CK is normal. Chest xray is negative. Influenza and strep negative.  I discussed results of tests with patient which are reassuring.  No evidence of emergent condition noted. Unclear cause for her symptoms and the elevated inflammatory labs are non-specific. However, I recommend follow-up with rheumatology for further work-up.  Patient asked about pain medication for home. I recommend Tylenol and Ibuprofen.  Patient's response to this was immediate crying and frustration. Patient states \"how will I be able to work or take care of my 2 year old with this pain\".  I had a long discussion with patient about the use of narcotics.  I would not know what condition I am treating her for.  I recommended home and rest.  Again she is very upset by this.  I asked her if she had a primary care provider " and she does not.  I have some suspicion for drug seeking behavior given her significant demeanor changes when discussing pain medication.     The following plan made and agreed with by the patient:    -Follow-up with Magaly Simon at [facility] on  Friday 12/22/2017 for recheck and further management of pain.  -Make appointment for rheumatology for first available appointment. [phone]  -Rest.  -Flexeril 10mg up to three times a day as needed for muscle relaxer.  -Use ibuprofen 600 mg every 6-8 hours per day as needed for pain. Stop if it is causing nausea or abdominal pain.   -Tramadol 50-100mg every 6 hours as needed for more severe pain. Do not use alcohol, operate machinery, drive, or climb on ladders for 8 hours after taking Percocet. Use docusate (100mg) 2 times a day to prevent constipation while on narcotics.  (the proscription monitoring database was reviewed and patient has not had frequent narcotic prescriptions)       I have reviewed the nursing notes.    I have reviewed the findings, diagnosis, plan and need for follow up with the patient.      Discharge Medication List as of 12/20/2017  2:54 PM      START taking these medications    Details   traMADol (ULTRAM) 50 MG tablet Take 1-2 tablets ( mg) by mouth every 6 hours as needed for pain maximum 8 tablet(s) per day, Disp-20 tablet, R-0, Local Print      !! cyclobenzaprine (FLEXERIL) 10 MG tablet Take 1 tablet (10 mg) by mouth 3 times daily as needed for muscle spasms, Disp-21 tablet, R-0, E-Prescribe       !! - Potential duplicate medications found. Please discuss with provider.          Final diagnoses:   Myalgia   Asymptomatic microscopic hematuria       12/20/2017   [facility] EMERGENCY DEPARTMENT     Ame, Vaishali Barriga, AMIRA CNP  12/20/17 [id number]

## 2017-12-20 NOTE — DISCHARGE INSTRUCTIONS
Follow-up with Magaly Simon at [facility] on  Friday 12/22/2017 for recheck and further management of pain.  Make appointment for rheumatology for first available appointment. [phone]  Rest.  Flexeril 10mg up to three times a day as needed for muscle relaxer.  Use ibuprofen 600 mg every 6-8 hours per day as needed for pain. Stop if it is causing nausea or abdominal pain.   Tramadol 50-100mg every 6 hours as needed for more severe pain. Do not use alcohol, operate machinery, drive, or climb on ladders for 8 hours after taking Percocet. Use docusate (100mg) 2 times a day to prevent constipation while on narcotics.      Opioid Medication Information    Pain medications are among the most commonly prescribed medicines, so we are including this information for all our patients. If you did not receive pain medication or get a prescription for pain medicine, you can ignore it.     You may have been given a prescription for an opioid (narcotic) pain medicine and/or have received a pain medicine while here in the Emergency Department. These medicines can make you drowsy or impaired. You must not drive, operate dangerous equipment, or engage in any other dangerous activities while taking these medications. If you drive while taking these medications, you could be arrested for DUI, or driving under the influence. Do not drink any alcohol while you are taking these medications.     Opioid pain medications can cause addiction. If you have a history of chemical dependency of any type, you are at a higher risk of becoming addicted to pain medications.  Only take these prescribed medications to treat your pain when all other options have been tried. Take it for as short a time and as few doses as possible. Store your pain pills in a secure place, as they are frequently stolen and provide a dangerous opportunity for children or visitors in your house to start abusing these powerful medications. We will not  replace any lost or stolen medicine.  As soon as your pain is better, you should flush all your remaining medication.     Many prescription pain medications contain Tylenol  (acetaminophen), including Vicodin , Tylenol #3 , Norco , Lortab , and Percocet .  You should not take any extra pills of Tylenol  if you are using these prescription medications or you can get very sick.  Do not ever take more than 3000 mg of acetaminophen in any 24 hour period.    All opioids tend to cause constipation. Drink plenty of water and eat foods that have a lot of fiber, such as fruits, vegetables, prune juice, apple juice and high fiber cereal.  Take a laxative if you don t move your bowels at least every other day. Miralax , Milk of Magnesia, Colace , or Senna  can be used to keep you regular.      Remember that you can always come back to the Emergency Department if you are not able to see your regular doctor in the amount of time listed above, if you get any new symptoms, or if there is anything that worries you.

## 2017-12-20 NOTE — ED NOTES
Pt discharge instructions reviewed and received. There are no unanswered questions at the time of discharge. Pt has a  to escort them home and pt understands hazards of driving after receiving mood altering medications. Escorted to lobby for discharge.

## 2017-12-20 NOTE — ED AVS SNAPSHOT
[facility] Emergency Department    [address] [address]    Phone:  [phone]    Fax:  [phone]                                       Emily Moreira   MRN: [id number]    Department:  [facility] Emergency Department   Date of Visit:  12/20/2017           Patient Information     Date Of Birth          1985        Your diagnoses for this visit were:     Myalgia     Asymptomatic microscopic hematuria        You were seen by Vaishali Mccloud APRN CNP.      Follow-up Information     Follow up with Magaly Simon PA-C On 12/22/2017.    Specialty:  Physician Assistant    Why:  at 11:40AM.    Contact information:    [address]  [place] [address] [phone]          Schedule an appointment as soon as possible for a visit with [facility].    Specialty:  Rheumatology    Contact information:    [address] [place] [id number] [phone]    Additional information:    The medical center is located at   [address] [place] [address] (between [facility] and   [facility] in [place], four miles north   of [place]).        Discharge Instructions       Follow-up with Magaly Simon at [facility] on  Friday 12/22/2017 for recheck and further management of pain.  Make appointment for rheumatology for first available appointment. [phone]  Rest.  Flexeril 10mg up to three times a day as needed for muscle relaxer.  Use ibuprofen 600 mg every 6-8 hours per day as needed for pain. Stop if it is causing nausea or abdominal pain.   Tramadol 50-100mg every 6 hours as needed for more severe pain. Do not use alcohol, operate machinery, drive, or climb on ladders for 8 hours after taking Percocet. Use docusate (100mg) 2 times a day to prevent constipation while on narcotics.      Opioid Medication Information    Pain medications are among the most commonly prescribed medicines, so we are including this information for all our  patients. If you did not receive pain medication or get a prescription for pain medicine, you can ignore it.     You may have been given a prescription for an opioid (narcotic) pain medicine and/or have received a pain medicine while here in the Emergency Department. These medicines can make you drowsy or impaired. You must not drive, operate dangerous equipment, or engage in any other dangerous activities while taking these medications. If you drive while taking these medications, you could be arrested for DUI, or driving under the influence. Do not drink any alcohol while you are taking these medications.     Opioid pain medications can cause addiction. If you have a history of chemical dependency of any type, you are at a higher risk of becoming addicted to pain medications.  Only take these prescribed medications to treat your pain when all other options have been tried. Take it for as short a time and as few doses as possible. Store your pain pills in a secure place, as they are frequently stolen and provide a dangerous opportunity for children or visitors in your house to start abusing these powerful medications. We will not replace any lost or stolen medicine.  As soon as your pain is better, you should flush all your remaining medication.     Many prescription pain medications contain Tylenol  (acetaminophen), including Vicodin , Tylenol #3 , Norco , Lortab , and Percocet .  You should not take any extra pills of Tylenol  if you are using these prescription medications or you can get very sick.  Do not ever take more than 3000 mg of acetaminophen in any 24 hour period.    All opioids tend to cause constipation. Drink plenty of water and eat foods that have a lot of fiber, such as fruits, vegetables, prune juice, apple juice and high fiber cereal.  Take a laxative if you don t move your bowels at least every other day. Miralax , Milk of Magnesia, Colace , or Senna  can be used to keep you regular.       Remember that you can always come back to the Emergency Department if you are not able to see your regular doctor in the amount of time listed above, if you get any new symptoms, or if there is anything that worries you.          Future Appointments        Provider Department Dept Phone Center    12/22/2017 11:40 AM Magaly Simon PA-C [facility] [phone] [facility]      24 Hour Appointment Hotline       To make an appointment at any [facility], call [phone] ([phone]). If you don't have a family doctor or clinic, we will help you find one. [facility] are conveniently located to serve the needs of you and your family.             Review of your medicines      START taking        Dose / Directions Last dose taken    traMADol 50 MG tablet   Commonly known as:  ULTRAM   Dose:   mg   Quantity:  20 tablet        Take 1-2 tablets ( mg) by mouth every 6 hours as needed for pain maximum 8 tablet(s) per day   Refills:  0          CONTINUE these medicines which may have CHANGED, or have new prescriptions. If we are uncertain of the size of tablets/capsules you have at home, strength may be listed as something that might have changed.        Dose / Directions Last dose taken    * cyclobenzaprine 10 MG tablet   Commonly known as:  FLEXERIL   Dose:  10 mg   What changed:  Another medication with the same name was added. Make sure you understand how and when to take each.   Quantity:  14 tablet        Take 1 tablet (10 mg) by mouth nightly as needed for muscle spasms   Refills:  1        * cyclobenzaprine 10 MG tablet   Commonly known as:  FLEXERIL   Dose:  10 mg   What changed:  You were already taking a medication with the same name, and this prescription was added. Make sure you understand how and when to take each.   Quantity:  21 tablet        Take 1 tablet (10 mg) by mouth 3 times daily as needed for muscle spasms   Refills:  0        * Notice:  This list has 2  medication(s) that are the same as other medications prescribed for you. Read the directions carefully, and ask your doctor or other care provider to review them with you.      Our records show that you are taking the medicines listed below. If these are incorrect, please call your family doctor or clinic.        Dose / Directions Last dose taken    albuterol 108 (90 BASE) MCG/ACT Inhaler   Commonly known as:  PROAIR HFA/PROVENTIL HFA/VENTOLIN HFA   Dose:  2 puff   Quantity:  1 Inhaler        Inhale 2 puffs into the lungs every 6 hours as needed for shortness of breath / dyspnea or wheezing   Refills:  1        escitalopram 10 MG tablet   Commonly known as:  LEXAPRO   Dose:  10 mg   Quantity:  30 tablet        Take 1 tablet (10 mg) by mouth daily   Refills:  0        hydrOXYzine 25 MG tablet   Commonly known as:  ATARAX   Dose:  25-50 mg   Quantity:  45 tablet        Take 1-2 tablets (25-50 mg) by mouth every 6 hours as needed for anxiety   Refills:  1        ibuprofen 400 MG tablet   Commonly known as:  ADVIL/MOTRIN   Dose:  400-800 mg   Quantity:  120 tablet        Take 1-2 tablets (400-800 mg) by mouth every 6 hours as needed for other (cramping)   Refills:  0        metoclopramide 10 MG tablet   Commonly known as:  REGLAN   Dose:  10 mg   Quantity:  60 tablet        Take 1 tablet (10 mg) by mouth 4 times daily as needed   Refills:  1        norgestimate-ethinyl estradiol 0.25-35 MG-MCG per tablet   Commonly known as:  ORTHO-CYCLEN, SPRINTEC   Dose:  1 tablet   Quantity:  84 tablet        Take 1 tablet by mouth daily   Refills:  3        omeprazole 20 MG CR capsule   Commonly known as:  priLOSEC   Dose:  20 mg   Quantity:  30 capsule        Take 1 capsule (20 mg) by mouth daily as needed Needs to be seen by provider for further refills   Refills:  0        ranitidine 300 MG tablet   Commonly known as:  ZANTAC   Dose:  300 mg   Quantity:  30 tablet        Take 1 tablet (300 mg) by mouth At Bedtime   Refills:  1         sucralfate 1 GM tablet   Commonly known as:  CARAFATE   Dose:  1 g   Quantity:  42 tablet        Take 1 tablet (1 g) by mouth 4 times daily   Refills:  1        SUMAtriptan 25 MG tablet   Commonly known as:  IMITREX   Dose:  25-50 mg   Quantity:  9 tablet        Take 1-2 tablets (25-50 mg) by mouth at onset of headache for migraine May repeat dose in 2 hours.  Do not exceed 200 mg in 24 hours   Refills:  3        triamcinolone 0.1 % cream   Commonly known as:  KENALOG   Quantity:  30 g        Apply sparingly to affected area two times daily for 10- 14 days.   Refills:  1                Prescriptions were sent or printed at these locations (2 Prescriptions)                   [facility] [place], [place] - [address]   [address] [address]    Telephone:  [phone]   Fax:  [phone]   Hours:                  E-Prescribed (1 of 2)         cyclobenzaprine (FLEXERIL) 10 MG tablet                 Printed at Department/Unit printer (1 of 2)         traMADol (ULTRAM) 50 MG tablet                Procedures and tests performed during your visit     Beta strep group A culture    CBC with platelets differential    CK total    CRP inflammation    Comprehensive metabolic panel    Drug abuse screen 77 urine ([place],SH)    Erythrocyte sedimentation rate auto    HCG qualitative urine (UPT)    Influenza A/B antigen    Lyme Disease Geovanna with reflex to WB Serum    Rapid Strep Screen    UA with Microscopic    XR Chest 2 Views      Orders Needing Specimen Collection     None      Pending Results     Date and Time Order Name Status Description    12/20/2017 1250 Beta strep group A culture In process     12/20/2017 1236 Lyme Disease Geovanna with reflex to WB Serum In process             Pending Culture Results     Date and Time Order Name Status Description    12/20/2017 1250 Beta strep group A culture In process             Pending Results Instructions     If you had any lab results that were not  finalized at the time of your Discharge, you can call the ED Lab Result RN at [phone]. You will be contacted by this team for any positive Lab results or changes in treatment. The nurses are available 7 days a week from 10A to 6:30P.  You can leave a message 24 hours per day and they will return your call.        Test Results From Your Hospital Stay        12/20/2017  1:20 PM      Component Results     Component Value Ref Range & Units Status    Sodium 138 133 - 144 mmol/L Final    Potassium 3.6 3.4 - 5.3 mmol/L Final    Chloride 108 94 - 109 mmol/L Final    Carbon Dioxide 24 20 - 32 mmol/L Final    Anion Gap 6 3 - 14 mmol/L Final    Glucose 103 (H) 70 - 99 mg/dL Final    Urea Nitrogen 14 7 - 30 mg/dL Final    Creatinine 0.67 0.52 - 1.04 mg/dL Final    GFR Estimate >90 >60 mL/min/1.7m2 Final    Non  GFR Calc    GFR Estimate If Black >90 >60 mL/min/1.7m2 Final    African American GFR Calc    Calcium 8.3 (L) 8.5 - 10.1 mg/dL Final    Bilirubin Total 0.2 0.2 - 1.3 mg/dL Final    Albumin 3.4 3.4 - 5.0 g/dL Final    Protein Total 7.6 6.8 - 8.8 g/dL Final    Alkaline Phosphatase 62 40 - 150 U/L Final    ALT 22 0 - 50 U/L Final    AST 20 0 - 45 U/L Final         12/20/2017  1:02 PM      Component Results     Component Value Ref Range & Units Status    WBC 4.4 4.0 - 11.0 10e9/L Final    RBC Count 4.27 3.8 - 5.2 10e12/L Final    Hemoglobin 13.1 11.7 - 15.7 g/dL Final    Hematocrit 38.6 35.0 - 47.0 % Final    MCV 90 78 - 100 fl Final    MCH 30.7 26.5 - 33.0 pg Final    MCHC 33.9 31.5 - 36.5 g/dL Final    RDW 11.5 10.0 - 15.0 % Final    Platelet Count 213 150 - 450 10e9/L Final    Diff Method Automated Method  Final    % Neutrophils 66.9 % Final    % Lymphocytes 17.8 % Final    % Monocytes 13.5 % Final    % Eosinophils 1.6 % Final    % Basophils 0.2 % Final    % Immature Granulocytes 0.0 % Final    Absolute Neutrophil 3.0 1.6 - 8.3 10e9/L Final    Absolute Lymphocytes 0.8 0.8 - 5.3 10e9/L Final    Absolute  Monocytes 0.6 0.0 - 1.3 10e9/L Final    Absolute Eosinophils 0.1 0.0 - 0.7 10e9/L Final    Absolute Basophils 0.0 0.0 - 0.2 10e9/L Final    Abs Immature Granulocytes 0.0 0 - 0.4 10e9/L Final         12/20/2017  1:18 PM      Component Results     Component Value Ref Range & Units Status    CRP Inflammation 22.3 (H) 0.0 - 8.0 mg/L Final         12/20/2017  1:22 PM      Component Results     Component Value Ref Range & Units Status    Sed Rate 28 (H) 0 - 20 mm/h Final         12/20/2017  2:13 PM      Narrative     XR CHEST 2 VW 12/20/2017 2:08 PM    COMPARISON: 7/17/2017.    HISTORY: Body aches.        Impression     IMPRESSION: Cardiac silhouette and pulmonary vasculature are within  normal limits. No focal airspace disease, pleural effusion or  pneumothorax.     DIO ENRIQUEZ MD         12/20/2017  2:20 PM      Component Results     Component Value Ref Range & Units Status    Color Urine Yellow  Final    Appearance Urine Clear  Final    Glucose Urine Negative NEG^Negative mg/dL Final    Bilirubin Urine Negative NEG^Negative Final    Ketones Urine Negative NEG^Negative mg/dL Final    Specific Gravity Urine 1.016 1.003 - 1.035 Final    Blood Urine Negative NEG^Negative Final    pH Urine 6.0 5.0 - 7.0 pH Final    Protein Albumin Urine 10 (A) NEG^Negative mg/dL Final    Urobilinogen mg/dL Normal 0.0 - 2.0 mg/dL Final    Nitrite Urine Negative NEG^Negative Final    Leukocyte Esterase Urine Negative NEG^Negative Final    Source Midstream Urine  Final    WBC Urine 1 0 - 2 /HPF Final    RBC Urine 9 (H) 0 - 2 /HPF Final    Squamous Epithelial /HPF Urine <1 0 - 1 /HPF Final    Mucous Urine Present (A) NEG^Negative /LPF Final         12/20/2017  1:57 PM      Component Results     Component Value Ref Range & Units Status    HCG Qual Urine Negative NEG^Negative Final    This test is for screening purposes.  Results should be interpreted along with   the clinical picture.  Confirmation testing is available if warranted by    ordering LID801, HCG Quantitative Pregnancy.           12/20/2017  2:06 PM      Component Results     Component Value Ref Range & Units Status    Amphetamine Qual Urine Negative NEG^Negative Final    Cutoff for a negative amphetamine is 500 ng/mL or less.    Barbiturates Qual Urine Negative NEG^Negative Final    Cutoff for a negative barbiturate is 200 ng/mL or less.    Benzodiazepine Qual Urine Negative NEG^Negative Final    Cutoff for a negative benzodiazepine is 200 ng/mL or less.    Cannabinoids Qual Urine Positive (A) NEG^Negative Final    Cutoff for a positive cannabinoid is greater than 50 ng/mL. This is an   unconfirmed screening result to be used for medical purposes only.      Cocaine Qual Urine Negative NEG^Negative Final    Cutoff for a negative cocaine is 300 ng/mL or less.    Opiates Qualitative Urine Negative NEG^Negative Final    Cutoff for a negative opiate is 300 ng/mL or less.    PCP Qual Urine Negative NEG^Negative Final    Cutoff for a negative PCP is 25 ng/mL or less.         12/20/2017  1:24 PM      Component Results     Component Value Ref Range & Units Status    Influenza A/B Agn Specimen Nasal  Final    Influenza A Negative NEG^Negative Final    Influenza B Negative NEG^Negative Final    Test results must be correlated with clinical data. If necessary, results   should be confirmed by a molecular assay or viral culture.           12/20/2017  1:15 PM      Component Results     Component    Specimen Description    Throat    Rapid Strep A Screen    NEGATIVE: No Group A streptococcal antigen detected by immunoassay, await culture report.         12/20/2017 12:55 PM         12/20/2017  2:42 PM         12/20/2017  2:15 PM      Component Results     Component Value Ref Range & Units Status    CK Total 100 30 - 225 U/L Final                Thank you for choosing [facility]       Thank you for choosing [facility] for your care. Our goal is always to provide you with excellent care. Hearing back from  "our patients is one way we can continue to improve our services. Please take a few minutes to complete the written survey that you may receive in the mail after you visit with us. Thank you!        [employer] Information     [employer] lets you send messages to your doctor, view your test results, renew your prescriptions, schedule appointments and more. To sign up, go to [email or web address] . Click on \"Log in\" on the left side of the screen, which will take you to the Welcome page. Then click on \"Sign up Now\" on the right side of the page.     You will be asked to enter the access code listed below, as well as some personal information. Please follow the directions to create your username and password.     Your access code is: [email or web address]  Expires: 2018  3:01 PM     Your access code will  in 90 days. If you need help or a new code, please call your [facility] clinic or [phone].        Care EveryWhere ID     This is your Care EveryWhere ID. This could be used by other organizations to access your [facility] medical records  [id number]        Equal Access to Services     MANGO LASSITER : Hadhesham Suárez, deborah segundo, brinda marsh, harriet swenson . So [facility] [phone].    ATENCIÓN: Si habla español, tiene a allen disposición servicios gratuitos de asistencia lingüística. Myesha al [phone].    We comply with applicable federal civil rights laws and [place] laws. We do not discriminate on the basis of race, color, national origin, age, disability, sex, sexual orientation, or gender identity.            After Visit Summary       This is your record. Keep this with you and show to your community pharmacist(s) and doctor(s) at your next visit.                  "

## 2017-12-20 NOTE — ED NOTES
"2 day hx of body \"pains\" muscles in thighs, upper arm are painful. Entire body hurts. No injury, or trauma. No abd pain , had some nausea a few weeks ago, not now. No  Diarrhea or constipation.  No fever. Denies URI sx. No cough.   "

## 2017-12-21 LAB — B BURGDOR IGG+IGM SER QL: 0.12 (ref 0–0.89)

## 2017-12-22 ENCOUNTER — OFFICE VISIT (OUTPATIENT)
Dept: FAMILY MEDICINE | Facility: CLINIC | Age: 32
End: 2017-12-22
Payer: COMMERCIAL

## 2017-12-22 VITALS
HEIGHT: 58 IN | TEMPERATURE: 98.3 F | SYSTOLIC BLOOD PRESSURE: 145 MMHG | DIASTOLIC BLOOD PRESSURE: 90 MMHG | WEIGHT: 155 LBS | HEART RATE: 101 BPM | OXYGEN SATURATION: 98 % | BODY MASS INDEX: 32.54 KG/M2

## 2017-12-22 DIAGNOSIS — M79.10 MYALGIA: Primary | ICD-10-CM

## 2017-12-22 LAB
BACTERIA SPEC CULT: NORMAL
SPECIMEN SOURCE: NORMAL

## 2017-12-22 PROCEDURE — 99214 OFFICE O/P EST MOD 30 MIN: CPT | Performed by: PHYSICIAN ASSISTANT

## 2017-12-22 RX ORDER — OXYCODONE AND ACETAMINOPHEN 5; 325 MG/1; MG/1
1-2 TABLET ORAL EVERY 4 HOURS PRN
Qty: 30 TABLET | Refills: 0 | Status: SHIPPED | OUTPATIENT
Start: 2017-12-22

## 2017-12-22 RX ORDER — MELOXICAM 15 MG/1
15 TABLET ORAL DAILY
Qty: 30 TABLET | Refills: 1 | Status: SHIPPED | OUTPATIENT
Start: 2017-12-22

## 2017-12-22 ASSESSMENT — PAIN SCALES - GENERAL: PAINLEVEL: WORST PAIN (10)

## 2017-12-22 NOTE — MR AVS SNAPSHOT
After Visit Summary   12/22/2017    Emily Moreira    MRN: [id number]           Patient Information     Date Of Birth          1985        Visit Information        Provider Department      12/22/2017 10:40 AM Magaly Simon PA-C [facility]        Today's Diagnoses     Myalgia    -  1       Follow-ups after your visit        Additional Services     RHEUMATOLOGY REFERRAL       Your provider has referred you to: [facility]: [facility] [phone]   [email or web address]  [facility]: [facility] [phone]   [email or web address]  [facility] [phone] Fax [phone] [email or web address]    Please be aware that coverage of these services is subject to the terms and limitations of your health insurance plan.  Call member services at your health plan with any benefit or coverage questions.      Please bring the following with you to your appointment:    (1) Any X-Rays, CTs or MRIs which have been performed.  Contact the facility where they were done to arrange for  prior to your scheduled appointment.    (2) List of current medications   (3) This referral request   (4) Any documents/labs given to you for this referral                  Who to contact     Normal or non-critical lab and imaging results will be communicated to you by MyChart, letter or phone within 4 business days after the clinic has received the results. If you do not hear from us within 7 days, please contact the clinic through MyChart or phone. If you have a critical or abnormal lab result, we will notify you by phone as soon as possible.  Submit refill requests through [employer] or call your pharmacy and they will forward the refill request to us. Please allow 3 business days for your refill to be completed.          If you need to speak with a  for  "additional information , please call: [phone]           Additional Information About Your Visit        [employer] Information     [employer] lets you send messages to your doctor, view your test results, renew your prescriptions, schedule appointments and more. To sign up, go to [email or web address] . Click on \"Log in\" on the left side of the screen, which will take you to the Welcome page. Then click on \"Sign up Now\" on the right side of the page.     You will be asked to enter the access code listed below, as well as some personal information. Please follow the directions to create your username and password.     Your access code is: [email or web address]  Expires: 2018  3:01 PM     Your access code will  in 90 days. If you need help or a new code, please call your [place] clinic or [phone].        Care EveryWhere ID     This is your Care EveryWhere ID. This could be used by other organizations to access your [place] medical records  [id number]        Your Vitals Were     Pulse Temperature Height Last Period Pulse Oximetry Breastfeeding?    101 98.3  F (36.8  C) (Tympanic) 4' 9.5\" (1.461 m) 2017 (Approximate) 98% No    BMI (Body Mass Index)                   32.96 kg/m2            Blood Pressure from Last 3 Encounters:   17 122/66   10/27/17 124/68   17 123/70    Weight from Last 3 Encounters:   17 155 lb (70.3 kg)   17 155 lb (70.3 kg)   10/27/17 166 lb 6.4 oz (75.5 kg)              We Performed the Following     RHEUMATOLOGY REFERRAL          Today's Medication Changes          These changes are accurate as of: 17 11:16 AM.  If you have any questions, ask your nurse or doctor.               Start taking these medicines.        Dose/Directions    meloxicam 15 MG tablet   Commonly known as:  MOBIC   Used for:  Myalgia   Started by:  Magaly Simon PA-C        Dose:  15 mg   Take 1 tablet (15 mg) by mouth daily   Quantity:  30 tablet   Refills:  1       " oxyCODONE-acetaminophen 5-325 MG per tablet   Commonly known as:  PERCOCET   Used for:  Myalgia   Started by:  Magaly Simon PA-C        Dose:  1-2 tablet   Take 1-2 tablets by mouth every 4 hours as needed for pain maximum 4 tablet(s) per day   Quantity:  30 tablet   Refills:  0         Stop taking these medicines if you haven't already. Please contact your care team if you have questions.     ibuprofen 400 MG tablet   Commonly known as:  ADVIL/MOTRIN   Stopped by:  Magaly Simon PA-C                Where to get your medicines      These medications were sent to [facility] [place] [facility] [address]  [address] [address]     Phone:  [phone]     meloxicam 15 MG tablet         Some of these will need a paper prescription and others can be bought over the counter.  Ask your nurse if you have questions.     Bring a paper prescription for each of these medications     oxyCODONE-acetaminophen 5-325 MG per tablet                Primary Care Provider Office Phone # Fax #    Magaly Simon PA-C [phone]       [address]  [facility] [address]        Equal Access to Services     MANGO LASSITER AH: Hadii paramjit og hadasho Soomaali, waaxda luqadaha, qaybta kaalmada adeegyada, harriet mcgill. So [facility] [phone].    ATENCIÓN: Si habla español, tiene a allen disposición servicios gratuitos de asistencia lingüística. Llame al [phone].    We comply with applicable federal civil rights laws and [place] laws. We do not discriminate on the basis of race, color, national origin, age, disability, sex, sexual orientation, or gender identity.            Thank you!     Thank you for choosing [facility]  for your care. Our goal is always to provide you with excellent care. Hearing back from our patients is one way we can continue to improve our services. Please take a few minutes to complete the written  survey that you may receive in the mail after your visit with us. Thank you!             Your Updated Medication List - Protect others around you: Learn how to safely use, store and throw away your medicines at www.disposemymeds.org.          This list is accurate as of: 12/22/17 11:16 AM.  Always use your most recent med list.                   Brand Name Dispense Instructions for use Diagnosis    albuterol 108 (90 BASE) MCG/ACT Inhaler    PROAIR HFA/PROVENTIL HFA/VENTOLIN HFA    1 Inhaler    Inhale 2 puffs into the lungs every 6 hours as needed for shortness of breath / dyspnea or wheezing    Mild intermittent asthma with acute exacerbation       * cyclobenzaprine 10 MG tablet    FLEXERIL    14 tablet    Take 1 tablet (10 mg) by mouth nightly as needed for muscle spasms    Chronic tension-type headache, not intractable       * cyclobenzaprine 10 MG tablet    FLEXERIL    21 tablet    Take 1 tablet (10 mg) by mouth 3 times daily as needed for muscle spasms        escitalopram 10 MG tablet    LEXAPRO    30 tablet    Take 1 tablet (10 mg) by mouth daily    MAURI (generalized anxiety disorder)       hydrOXYzine 25 MG tablet    ATARAX    45 tablet    Take 1-2 tablets (25-50 mg) by mouth every 6 hours as needed for anxiety    MAURI (generalized anxiety disorder)       meloxicam 15 MG tablet    MOBIC    30 tablet    Take 1 tablet (15 mg) by mouth daily    Myalgia       metoclopramide 10 MG tablet    REGLAN    60 tablet    Take 1 tablet (10 mg) by mouth 4 times daily as needed    Nausea, Gastroesophageal reflux disease with esophagitis       norgestimate-ethinyl estradiol 0.25-35 MG-MCG per tablet    ORTHO-CYCLEN, SPRINTEC    84 tablet    Take 1 tablet by mouth daily    Encounter for surveillance of contraceptive pills       omeprazole 20 MG CR capsule    priLOSEC    30 capsule    Take 1 capsule (20 mg) by mouth daily as needed Needs to be seen by provider for further refills    Acute gastritis without hemorrhage, unspecified  gastritis type       oxyCODONE-acetaminophen 5-325 MG per tablet    PERCOCET    30 tablet    Take 1-2 tablets by mouth every 4 hours as needed for pain maximum 4 tablet(s) per day    Myalgia       ranitidine 300 MG tablet    ZANTAC    30 tablet    Take 1 tablet (300 mg) by mouth At Bedtime    Gastroesophageal reflux disease with esophagitis       sucralfate 1 GM tablet    CARAFATE    42 tablet    Take 1 tablet (1 g) by mouth 4 times daily    Erosive gastritis       SUMAtriptan 25 MG tablet    IMITREX    9 tablet    Take 1-2 tablets (25-50 mg) by mouth at onset of headache for migraine May repeat dose in 2 hours.  Do not exceed 200 mg in 24 hours    Migraine without aura and without status migrainosus, not intractable       traMADol 50 MG tablet    ULTRAM    20 tablet    Take 1-2 tablets ( mg) by mouth every 6 hours as needed for pain maximum 8 tablet(s) per day        triamcinolone 0.1 % cream    KENALOG    30 g    Apply sparingly to affected area two times daily for 10- 14 days.    Eczema, unspecified type       * Notice:  This list has 2 medication(s) that are the same as other medications prescribed for you. Read the directions carefully, and ask your doctor or other care provider to review them with you.

## 2017-12-22 NOTE — NURSING NOTE
"Chief Complaint   Patient presents with     Hospital F/U       Initial Temp 98.3  F (36.8  C) (Tympanic)  Ht 4' 9.5\" (1.461 m)  Wt 155 lb (70.3 kg)  LMP 12/08/2017 (Approximate)  Breastfeeding? No  BMI 32.96 kg/m2 Estimated body mass index is 32.96 kg/(m^2) as calculated from the following:    Height as of this encounter: 4' 9.5\" (1.461 m).    Weight as of this encounter: 155 lb (70.3 kg).  Medication Reconciliation: complete     Sarita Dailey MA      "

## 2017-12-22 NOTE — PROGRESS NOTES
SUBJECTIVE:   Emily Moreira is a 32 year old female who presents to clinic today for the following health issues:      ED/UC Followup:    Facility:  [facility] ED  Date of visit: 12/20/2017  Reason for visit: Myalgia everywhere  Current Status: Pt feels worse than she did at ED.      Pain started suddenly 5 days ago.  No injury.   She states it is mainly in her arms and legs.  Started with the top of her thighs and then moved to the back of her legs.  She c/o weakness in the arms and legs.  Takes flexeril at night (works for a few hours).  Pain is waking her up at night.      Has been taking ibuprofen with flexeril and now she feels more heartburn.    Took 2 tramadol today with no relief.    Pain is currently rated 8/10 while at rest.          Problem list and histories reviewed & adjusted, as indicated.  Additional history: as documented    Patient Active Problem List   Diagnosis     Supervision of normal first pregnancy     Mild intermittent asthma     Kidney stones     Asthma     Atopic dermatitis and related condition     Drug dependence, in remission (H)     Migraine     Tobacco use disorder     Gastritis     Anxiety     Past Surgical History:   Procedure Laterality Date     HC ASPIRATION &/OR INJECTION GANGLION CYST, ANY      left foot     MOUTH SURGERY      wisdom teeth       Social History   Substance Use Topics     Smoking status: Current Every Day Smoker     Packs/day: 1.00     Types: Cigarettes     Smokeless tobacco: Never Used     Alcohol use Yes      Comment: ocasional     Family History   Problem Relation Age of Onset     Alcohol/Drug Mother      alcohol     DIABETES Maternal Grandfather      HEART DISEASE Maternal Grandfather      bypass/MI     Hypertension Maternal Grandfather      DIABETES Paternal Grandmother      HEART DISEASE Paternal Grandmother      Obesity Paternal Grandmother      Hypertension Paternal Grandmother      Cardiovascular Paternal Grandfather      MI         Current  Outpatient Prescriptions   Medication Sig Dispense Refill     oxyCODONE-acetaminophen (PERCOCET) 5-325 MG per tablet Take 1-2 tablets by mouth every 4 hours as needed for pain maximum 4 tablet(s) per day 30 tablet 0     meloxicam (MOBIC) 15 MG tablet Take 1 tablet (15 mg) by mouth daily 30 tablet 1     traMADol (ULTRAM) 50 MG tablet Take 1-2 tablets ( mg) by mouth every 6 hours as needed for pain maximum 8 tablet(s) per day 20 tablet 0     triamcinolone (KENALOG) 0.1 % cream Apply sparingly to affected area two times daily for 10- 14 days. 30 g 1     hydrOXYzine (ATARAX) 25 MG tablet Take 1-2 tablets (25-50 mg) by mouth every 6 hours as needed for anxiety 45 tablet 1     ranitidine (ZANTAC) 300 MG tablet Take 1 tablet (300 mg) by mouth At Bedtime 30 tablet 1     omeprazole (PRILOSEC) 20 MG CR capsule Take 1 capsule (20 mg) by mouth daily as needed Needs to be seen by provider for further refills 30 capsule 0     metoclopramide (REGLAN) 10 MG tablet Take 1 tablet (10 mg) by mouth 4 times daily as needed 60 tablet 1     norgestimate-ethinyl estradiol (ORTHO-CYCLEN, SPRINTEC) 0.25-35 MG-MCG per tablet Take 1 tablet by mouth daily 84 tablet 3     cyclobenzaprine (FLEXERIL) 10 MG tablet Take 1 tablet (10 mg) by mouth nightly as needed for muscle spasms 14 tablet 1     cyclobenzaprine (FLEXERIL) 10 MG tablet Take 1 tablet (10 mg) by mouth 3 times daily as needed for muscle spasms 21 tablet 0     escitalopram (LEXAPRO) 10 MG tablet Take 1 tablet (10 mg) by mouth daily (Patient not taking: Reported on 12/22/2017) 30 tablet 0     albuterol (PROAIR HFA/PROVENTIL HFA/VENTOLIN HFA) 108 (90 BASE) MCG/ACT Inhaler Inhale 2 puffs into the lungs every 6 hours as needed for shortness of breath / dyspnea or wheezing (Patient not taking: Reported on 12/22/2017) 1 Inhaler 1     sucralfate (CARAFATE) 1 GM tablet Take 1 tablet (1 g) by mouth 4 times daily (Patient not taking: Reported on 5/12/2017) 42 tablet 1     SUMAtriptan  "(IMITREX) 25 MG tablet Take 1-2 tablets (25-50 mg) by mouth at onset of headache for migraine May repeat dose in 2 hours.  Do not exceed 200 mg in 24 hours (Patient not taking: Reported on 5/12/2017) 9 tablet 3     BP Readings from Last 3 Encounters:   12/20/17 122/66   10/27/17 124/68   07/17/17 123/70    Wt Readings from Last 3 Encounters:   12/22/17 155 lb (70.3 kg)   12/20/17 155 lb (70.3 kg)   10/27/17 166 lb 6.4 oz (75.5 kg)                        Reviewed and updated as needed this visit by clinical staff       Reviewed and updated as needed this visit by Provider         ROS:  Constitutional, HEENT, cardiovascular, pulmonary, GI, , musculoskeletal, neuro, skin, endocrine and psych systems are negative, except as otherwise noted.      OBJECTIVE:   Pulse 101  Temp 98.3  F (36.8  C) (Tympanic)  Ht 4' 9.5\" (1.461 m)  Wt 155 lb (70.3 kg)  LMP 12/08/2017 (Approximate)  SpO2 98%  Breastfeeding? No  BMI 32.96 kg/m2  Body mass index is 32.96 kg/(m^2).  GEN ERAL: tearful and irritable during visit  NECK: no adenopathy, no asymmetry, masses, or scars and thyroid normal to palpation  RESP: lungs clear to auscultation - no rales, rhonchi or wheezes  CV: regular rate and rhythm, normal S1 S2, no S3 or S4, no murmur, click or rub, no peripheral edema and peripheral pulses strong  ABDOMEN: soft, nontender, no hepatosplenomegaly, no masses and bowel sounds normal  MS: no gross musculoskeletal defects noted, no edema, no warmth noted.  Generalized tenderness noted over entire body (arms, legs, chest, pretty much anywhere I touched).  Does not cringe in pain with a firm handshake however.     Diagnostic Test Results:  none     ASSESSMENT/PLAN:       1. Myalgia  Work-up thus far essentially stable except slightly elevated Sed rate in hospital.  She was told to f/u with rheumatologist but needs a referral. Her symptoms seem somewhat exaggerated on exam today, even the slightest touch or while taking her blood pressure " she was writhing in pain; however with a firm handshake I do not see any signs of pain. I discussed with her that I am very hesitant to prescribe addictive medications such as percocet given her previous drug dependance (I believe to benzo's).  It will take some time to get into a rheumatologist, her pain likely will improve/resolve by then but I do suggest she schedule that now.  I suggest she try mobic daily for pain as this causes less GI side effects.  Small script of percocet given and I made it extremely clear to her that this is not a medication that should be used long term, will only for a very limited amount of time.  If she continues to have pain, she will need to either establish with a pain clinic or we can discuss alternative pain medications that are not addictive.    - RHEUMATOLOGY REFERRAL  - oxyCODONE-acetaminophen (PERCOCET) 5-325 MG per tablet; Take 1-2 tablets by mouth every 4 hours as needed for pain maximum 4 tablet(s) per day  Dispense: 30 tablet; Refill: 0  - meloxicam (MOBIC) 15 MG tablet; Take 1 tablet (15 mg) by mouth daily  Dispense: 30 tablet; Refill: 1    FUTURE APPOINTMENTS:       - Follow-up visit if symptoms worsen or fail to improve as anticipated.     Magaly Simon PA-C  [facility]

## 2018-01-19 ENCOUNTER — OFFICE VISIT (OUTPATIENT)
Dept: FAMILY MEDICINE | Facility: CLINIC | Age: 33
End: 2018-01-19
Payer: COMMERCIAL

## 2018-01-19 VITALS
BODY MASS INDEX: 33.18 KG/M2 | HEIGHT: 59 IN | WEIGHT: 164.6 LBS | TEMPERATURE: 99.6 F | OXYGEN SATURATION: 99 % | SYSTOLIC BLOOD PRESSURE: 130 MMHG | DIASTOLIC BLOOD PRESSURE: 70 MMHG | HEART RATE: 121 BPM

## 2018-01-19 DIAGNOSIS — R05.9 COUGH: Primary | ICD-10-CM

## 2018-01-19 DIAGNOSIS — R07.0 THROAT PAIN: ICD-10-CM

## 2018-01-19 DIAGNOSIS — J10.1 INFLUENZA A: ICD-10-CM

## 2018-01-19 DIAGNOSIS — Z30.41 ENCOUNTER FOR SURVEILLANCE OF CONTRACEPTIVE PILLS: ICD-10-CM

## 2018-01-19 LAB
DEPRECATED S PYO AG THROAT QL EIA: NORMAL
FLUAV+FLUBV AG SPEC QL: NEGATIVE
FLUAV+FLUBV AG SPEC QL: POSITIVE
SPECIMEN SOURCE: ABNORMAL
SPECIMEN SOURCE: NORMAL

## 2018-01-19 PROCEDURE — 87880 STREP A ASSAY W/OPTIC: CPT | Performed by: NURSE PRACTITIONER

## 2018-01-19 PROCEDURE — 99213 OFFICE O/P EST LOW 20 MIN: CPT | Performed by: NURSE PRACTITIONER

## 2018-01-19 PROCEDURE — 87081 CULTURE SCREEN ONLY: CPT | Performed by: NURSE PRACTITIONER

## 2018-01-19 PROCEDURE — 87804 INFLUENZA ASSAY W/OPTIC: CPT | Performed by: NURSE PRACTITIONER

## 2018-01-19 RX ORDER — OSELTAMIVIR PHOSPHATE 75 MG/1
75 CAPSULE ORAL 2 TIMES DAILY
Qty: 10 CAPSULE | Refills: 0 | Status: SHIPPED | OUTPATIENT
Start: 2018-01-19

## 2018-01-19 RX ORDER — CODEINE PHOSPHATE AND GUAIFENESIN 10; 100 MG/5ML; MG/5ML
1-2 SOLUTION ORAL EVERY 4 HOURS PRN
Qty: 180 ML | Refills: 1 | Status: SHIPPED | OUTPATIENT
Start: 2018-01-19

## 2018-01-19 ASSESSMENT — ENCOUNTER SYMPTOMS
DIARRHEA: 0
WHEEZING: 1
COUGH: 1
SHORTNESS OF BREATH: 1
MYALGIAS: 1
SORE THROAT: 1
RHINORRHEA: 0
DIZZINESS: 0
DIAPHORESIS: 0
LIGHT-HEADEDNESS: 0
CONSTIPATION: 0
CHEST TIGHTNESS: 1
CHILLS: 1
SINUS PRESSURE: 1
FATIGUE: 1
EYE ITCHING: 0
HEADACHES: 1
NAUSEA: 0
VOMITING: 1
EYE DISCHARGE: 0
FEVER: 1

## 2018-01-19 NOTE — TELEPHONE ENCOUNTER
Last Written Prescription Date:  1/17/2017  Last Fill Quantity: 84,  # refills: 3   Last Office Visit with [facility], [facility] or [facility] prescribing provider:  1/19/2018   Future Office Visit:       Lisa Barlow CPhT  [id number]) [address] [place] [address]  Phone: [phone]  Fax: [phone]

## 2018-01-19 NOTE — LETTER
January 22, 2018      Emily Moreira  [address]  [place] [facility] [address]                  The results of your recent throat culture were negative. If you have any further questions or concerns please contact the clinic.          Sincerely,        AMIRA Polo CNP

## 2018-01-19 NOTE — LETTER
[facility]  [address] [facility] [address]  Phone: [phone]    January 19, 2018        Emily Moreira  [address]  [facility] [address]          To whom it may concern:    RE: Emily Moreira    Patient was seen and treated today at our clinic. She should remain off work until has been 24 hour fever free.     Please contact me for questions or concerns.      Sincerely,        AMIRA Polo CNP

## 2018-01-19 NOTE — PATIENT INSTRUCTIONS
These are general instructions and may not be specific to you. Please call, email or follow up if you have any questions or concerns.     The Flu (Influenza)     The virus that causes the flu spreads through the air in droplets when someone who has the flu coughs, sneezes, laughs, or talks.   The flu (influenza) is an infection that affects your respiratory tract. This tract is made up of your mouth, nose, and lungs, and the passages between them. Unlike a cold, the flu can make you very ill. And it can lead to pneumonia, a serious lung infection. The flu can have serious complications and even cause death.  Who is at risk for the flu?  Anyone can get the flu. But you are more likely to become infected if you:    Have a weakened immune system    Work in a healthcare setting where you may be exposed to flu germs    Live or work with someone who has the flu    Haven t had an annual flu shot  How does the flu spread?  The flu is caused by a virus. The virus spreads through the air in droplets when someone who has the flu coughs, sneezes, laughs, or talks. You can become infected when you inhale these viruses directly. You can also become infected when you touch a surface on which the droplets have landed and then transfer the germs to your eyes, nose, or mouth. Touching used tissues, or sharing utensils, drinking glasses, or a toothbrush from an infected person can expose you to flu viruses, too.  What are the symptoms of the flu?  Flu symptoms tend to come on quickly and may last a few days to a few weeks. They include:    Fever usually higher than 100.4 F  (38 C) and chills    Sore throat and headache    Dry cough    Runny nose    Tiredness and weakness    Muscle aches  Who is at risk for flu complications?  For some people, the flu can be very serious. The risk for complications is greater for:    Children younger than age 5    Adults ages 65 and older    People with a chronic illness such as diabetes or heart,  kidney, or lung disease    People who live in a nursing home or long-term care facility   How is the flu treated?  The flu usually gets better after 7 days or so. In some cases, your healthcare provider may prescribe an antiviral medicine. This may help you get well a little sooner. For the medicine to help, you need to take it as soon as possible (ideally within 48 hours) after your symptoms start. If you develop pneumonia or other serious illness, you may need to stay in the hospital.  Easing flu symptoms    Drink lots of fluids such as water, juice, and warm soup. A good rule is to drink enough so that you urinate your normal amount.    Get plenty of rest.    Ask your healthcare provider what to take for fever and pain.    Call your provider if your fever is 100.4 F (38 C) or higher, or you become dizzy, lightheaded, or short of breath.  Taking steps to protect others    Wash your hands often, especially after coughing or sneezing. Or clean your hands with an alcohol-based hand  containing at least 60% alcohol.    Cough or sneeze into a tissue. Then throw the tissue away and wash your hands. If you don t have a tissue, cough and sneeze into your elbow.    Stay home until at least 24 hours after you no longer have a fever or chills. Be sure the fever isn t being hidden by fever-reducing medicine.    Don t share food, utensils, drinking glasses, or a toothbrush with others.    Ask your healthcare provider if others in your household should get antiviral medicine to help them avoid infection.  How can the flu be prevented?    One of the best ways to avoid the flu is to get a flu vaccine each year. The virus that causes the flu changes from year to year. For that reason, healthcare providers recommend getting the flu vaccine each year, as soon as it's available in your area. The vaccine is given as a shot. Your healthcare provider can tell you which vaccine is right for you. A nasal spray is also available  but is not recommended for the 0761-7352 flu season. The CDC says this is because the nasal spray did not seem to protect against the flu over the last several flu seasons. In the past, it was meant for people ages 2 to 49.    Wash your hands often. Frequent handwashing is a proven way to help prevent infection.    Carry an alcohol-based hand gel containing at least 60% alcohol. Use it when you can't use soap and water. Then wash your hands as soon as you can.    Avoid touching your eyes, nose, and mouth.    At home and work, clean phones, computer keyboards, and toys often with disinfectant wipes.    If possible, avoid close contact with others who have the flu or symptoms of the flu.  Handwashing tips  Handwashing is one of the best ways to prevent many common infections. If you are caring for or visiting someone with the flu, wash your hands each time you enter and leave the room. Follow these steps:    Use warm water and plenty of soap. Rub your hands together well.    Clean the whole hand, including under your nails, between your fingers, and up the wrists.    Wash for at least 15 seconds.    Rinse, letting the water run down your fingers, not up your wrists.    Dry your hands well. Use a paper towel to turn off the faucet and open the door.  Using alcohol-based hand   Alcohol-based hand  are also a good choice. Use them when you can't use soap and water. Follow these steps:    Squeeze about a tablespoon of gel into the palm of one hand.    Rub your hands together briskly, cleaning the backs of your hands, the palms, between your fingers, and up the wrists.    Rub until the gel is gone and your hands are completely dry.  Preventing the flu in healthcare settings  The flu is a special concern for people in hospitals and long-term care facilities. To help prevent the spread of flu, many hospitals and nursing homes take these steps:    Healthcare providers wash their hands or use an alcohol-based  hand  before and after treating each patient.    People with the flu have private rooms and bathrooms or share a room with someone with the same infection.    People who are at high risk for the flu but don't have it are encouraged to get the flu and pneumonia vaccines.    All healthcare workers are encouraged or required to get flu shots.   Date Last Reviewed: 12/1/2016 2000-2017 [employer]. [address] [place] [address], [place] [address]. All rights reserved. This information is not intended as a substitute for professional medical care. Always follow your healthcare professional's instructions.

## 2018-01-19 NOTE — MR AVS SNAPSHOT
After Visit Summary   1/19/2018    Emily Moreira    MRN: [id number]           Patient Information     Date Of Birth          1985        Visit Information        Provider Department      1/19/2018 1:20 PM Angela Michele APRN [facility]        Today's Diagnoses     Cough    -  1    Throat pain        Influenza A          Care Instructions    These are general instructions and may not be specific to you. Please call, email or follow up if you have any questions or concerns.     The Flu (Influenza)     The virus that causes the flu spreads through the air in droplets when someone who has the flu coughs, sneezes, laughs, or talks.   The flu (influenza) is an infection that affects your respiratory tract. This tract is made up of your mouth, nose, and lungs, and the passages between them. Unlike a cold, the flu can make you very ill. And it can lead to pneumonia, a serious lung infection. The flu can have serious complications and even cause death.  Who is at risk for the flu?  Anyone can get the flu. But you are more likely to become infected if you:    Have a weakened immune system    Work in a healthcare setting where you may be exposed to flu germs    Live or work with someone who has the flu    Haven t had an annual flu shot  How does the flu spread?  The flu is caused by a virus. The virus spreads through the air in droplets when someone who has the flu coughs, sneezes, laughs, or talks. You can become infected when you inhale these viruses directly. You can also become infected when you touch a surface on which the droplets have landed and then transfer the germs to your eyes, nose, or mouth. Touching used tissues, or sharing utensils, drinking glasses, or a toothbrush from an infected person can expose you to flu viruses, too.  What are the symptoms of the flu?  Flu symptoms tend to come on quickly and may last a few days to a few weeks. They include:    Fever  usually higher than 100.4 F  (38 C) and chills    Sore throat and headache    Dry cough    Runny nose    Tiredness and weakness    Muscle aches  Who is at risk for flu complications?  For some people, the flu can be very serious. The risk for complications is greater for:    Children younger than age 5    Adults ages 65 and older    People with a chronic illness such as diabetes or heart, kidney, or lung disease    People who live in a nursing home or long-term care facility   How is the flu treated?  The flu usually gets better after 7 days or so. In some cases, your healthcare provider may prescribe an antiviral medicine. This may help you get well a little sooner. For the medicine to help, you need to take it as soon as possible (ideally within 48 hours) after your symptoms start. If you develop pneumonia or other serious illness, you may need to stay in the hospital.  Easing flu symptoms    Drink lots of fluids such as water, juice, and warm soup. A good rule is to drink enough so that you urinate your normal amount.    Get plenty of rest.    Ask your healthcare provider what to take for fever and pain.    Call your provider if your fever is 100.4 F (38 C) or higher, or you become dizzy, lightheaded, or short of breath.  Taking steps to protect others    Wash your hands often, especially after coughing or sneezing. Or clean your hands with an alcohol-based hand  containing at least 60% alcohol.    Cough or sneeze into a tissue. Then throw the tissue away and wash your hands. If you don t have a tissue, cough and sneeze into your elbow.    Stay home until at least 24 hours after you no longer have a fever or chills. Be sure the fever isn t being hidden by fever-reducing medicine.    Don t share food, utensils, drinking glasses, or a toothbrush with others.    Ask your healthcare provider if others in your household should get antiviral medicine to help them avoid infection.  How can the flu be  prevented?    One of the best ways to avoid the flu is to get a flu vaccine each year. The virus that causes the flu changes from year to year. For that reason, healthcare providers recommend getting the flu vaccine each year, as soon as it's available in your area. The vaccine is given as a shot. Your healthcare provider can tell you which vaccine is right for you. A nasal spray is also available but is not recommended for the 2561-4083 flu season. The CDC says this is because the nasal spray did not seem to protect against the flu over the last several flu seasons. In the past, it was meant for people ages 2 to 49.    Wash your hands often. Frequent handwashing is a proven way to help prevent infection.    Carry an alcohol-based hand gel containing at least 60% alcohol. Use it when you can't use soap and water. Then wash your hands as soon as you can.    Avoid touching your eyes, nose, and mouth.    At home and work, clean phones, computer keyboards, and toys often with disinfectant wipes.    If possible, avoid close contact with others who have the flu or symptoms of the flu.  Handwashing tips  Handwashing is one of the best ways to prevent many common infections. If you are caring for or visiting someone with the flu, wash your hands each time you enter and leave the room. Follow these steps:    Use warm water and plenty of soap. Rub your hands together well.    Clean the whole hand, including under your nails, between your fingers, and up the wrists.    Wash for at least 15 seconds.    Rinse, letting the water run down your fingers, not up your wrists.    Dry your hands well. Use a paper towel to turn off the faucet and open the door.  Using alcohol-based hand   Alcohol-based hand  are also a good choice. Use them when you can't use soap and water. Follow these steps:    Squeeze about a tablespoon of gel into the palm of one hand.    Rub your hands together briskly, cleaning the backs of your  hands, the palms, between your fingers, and up the wrists.    Rub until the gel is gone and your hands are completely dry.  Preventing the flu in healthcare settings  The flu is a special concern for people in hospitals and long-term care facilities. To help prevent the spread of flu, many hospitals and nursing homes take these steps:    Healthcare providers wash their hands or use an alcohol-based hand  before and after treating each patient.    People with the flu have private rooms and bathrooms or share a room with someone with the same infection.    People who are at high risk for the flu but don't have it are encouraged to get the flu and pneumonia vaccines.    All healthcare workers are encouraged or required to get flu shots.   Date Last Reviewed: 12/1/2016 2000-2017 [employer] [address] [place] [address]. All rights reserved. This information is not intended as a substitute for professional medical care. Always follow your healthcare professional's instructions.                  Follow-ups after your visit        Who to contact     Normal or non-critical lab and imaging results will be communicated to you by [employer], letter or phone within 4 business days after the clinic has received the results. If you do not hear from us within 7 days, please contact the clinic through [employer] or phone. If you have a critical or abnormal lab result, we will notify you by phone as soon as possible.  Submit refill requests through [employer] or call your pharmacy and they will forward the refill request to us. Please allow 3 business days for your refill to be completed.          If you need to speak with a  for additional information , please call: [phone]           Additional Information About Your Visit        [employer] Information     [employer] lets you send messages to your doctor, view your test results, renew your prescriptions, schedule appointments and more.  "To sign up, go to [email or web address] . Click on \"Log in\" on the left side of the screen, which will take you to the Welcome page. Then click on \"Sign up Now\" on the right side of the page.     You will be asked to enter the access code listed below, as well as some personal information. Please follow the directions to create your username and password.     Your access code is: [email or web address]  Expires: 2018  3:01 PM     Your access code will  in 90 days. If you need help or a new code, please call your [facility] clinic or [phone].        Care EveryWhere ID     This is your Care EveryWhere ID. This could be used by other organizations to access your [facility] medical records  [id number]        Your Vitals Were     Pulse Temperature Height Last Period Pulse Oximetry BMI (Body Mass Index)    121 99.6  F (37.6  C) (Tympanic) 4' 10.75\" (1.492 m) 2017 (Approximate) 99% 33.53 kg/m2       Blood Pressure from Last 3 Encounters:   18 130/70   17 145/90   17 122/66    Weight from Last 3 Encounters:   18 164 lb 9.6 oz (74.7 kg)   17 155 lb (70.3 kg)   17 155 lb (70.3 kg)              We Performed the Following     Beta strep group A culture     Influenza A/B antigen     Rapid strep screen          Today's Medication Changes          These changes are accurate as of: 18  2:00 PM.  If you have any questions, ask your nurse or doctor.               Start taking these medicines.        Dose/Directions    guaiFENesin-codeine 100-10 MG/5ML Soln solution   Commonly known as:  ROBITUSSIN AC   Used for:  Influenza A   Started by:  Angela Michele APRN CNP        Dose:  1-2 tsp.   Take 5-10 mLs by mouth every 4 hours as needed for cough   Quantity:  180 mL   Refills:  1       oseltamivir 75 MG capsule   Commonly known as:  TAMIFLU   Used for:  Influenza A   Started by:  Angela Michele APRN CNP        Dose:  75 mg   Take 1 capsule (75 mg) by mouth 2 times " daily   Quantity:  10 capsule   Refills:  0            Where to get your medicines      These medications were sent to [facility] [place] - [place], [place] - [address]  [address], [place] [address]     Phone:  [phone]     oseltamivir 75 MG capsule         Some of these will need a paper prescription and others can be bought over the counter.  Ask your nurse if you have questions.     Bring a paper prescription for each of these medications     guaiFENesin-codeine 100-10 MG/5ML Soln solution                Primary Care Provider Office Phone # Fax #    Magaly Simon PA-C [phone] [address] [place] [address]        Equal Access to Services     MANGO LASSITER : Hadii paramjit normano Soyvette, waaxda luqadaha, qaybta kaalmada adeegyada, harriet mcgill. So [facility] [phone].    ATENCIÓN: Si habla español, tiene a allen disposición servicios gratuitos de asistencia lingüística. Llame al [phone].    We comply with applicable federal civil rights laws and [place] laws. We do not discriminate on the basis of race, color, national origin, age, disability, sex, sexual orientation, or gender identity.            Thank you!     Thank you for choosing [facility]  for your care. Our goal is always to provide you with excellent care. Hearing back from our patients is one way we can continue to improve our services. Please take a few minutes to complete the written survey that you may receive in the mail after your visit with us. Thank you!             Your Updated Medication List - Protect others around you: Learn how to safely use, store and throw away your medicines at www.disposemymeds.org.          This list is accurate as of: 1/19/18  2:00 PM.  Always use your most recent med list.                   Brand Name Dispense Instructions for use Diagnosis    albuterol 108 (90 BASE) MCG/ACT Inhaler    PROAIR  HFA/PROVENTIL HFA/VENTOLIN HFA    1 Inhaler    Inhale 2 puffs into the lungs every 6 hours as needed for shortness of breath / dyspnea or wheezing    Mild intermittent asthma with acute exacerbation       * cyclobenzaprine 10 MG tablet    FLEXERIL    14 tablet    Take 1 tablet (10 mg) by mouth nightly as needed for muscle spasms    Chronic tension-type headache, not intractable       * cyclobenzaprine 10 MG tablet    FLEXERIL    21 tablet    Take 1 tablet (10 mg) by mouth 3 times daily as needed for muscle spasms        escitalopram 10 MG tablet    LEXAPRO    30 tablet    Take 1 tablet (10 mg) by mouth daily    MAURI (generalized anxiety disorder)       guaiFENesin-codeine 100-10 MG/5ML Soln solution    ROBITUSSIN AC    180 mL    Take 5-10 mLs by mouth every 4 hours as needed for cough    Influenza A       hydrOXYzine 25 MG tablet    ATARAX    45 tablet    Take 1-2 tablets (25-50 mg) by mouth every 6 hours as needed for anxiety    MAURI (generalized anxiety disorder)       meloxicam 15 MG tablet    MOBIC    30 tablet    Take 1 tablet (15 mg) by mouth daily    Myalgia       metoclopramide 10 MG tablet    REGLAN    60 tablet    Take 1 tablet (10 mg) by mouth 4 times daily as needed    Nausea, Gastroesophageal reflux disease with esophagitis       norgestimate-ethinyl estradiol 0.25-35 MG-MCG per tablet    ORTHO-CYCLEN, SPRINTEC    84 tablet    Take 1 tablet by mouth daily    Encounter for surveillance of contraceptive pills       omeprazole 20 MG CR capsule    priLOSEC    30 capsule    Take 1 capsule (20 mg) by mouth daily as needed Needs to be seen by provider for further refills    Acute gastritis without hemorrhage, unspecified gastritis type       oseltamivir 75 MG capsule    TAMIFLU    10 capsule    Take 1 capsule (75 mg) by mouth 2 times daily    Influenza A       oxyCODONE-acetaminophen 5-325 MG per tablet    PERCOCET    30 tablet    Take 1-2 tablets by mouth every 4 hours as needed for pain maximum 4 tablet(s) per  day    Myalgia       ranitidine 300 MG tablet    ZANTAC    30 tablet    Take 1 tablet (300 mg) by mouth At Bedtime    Gastroesophageal reflux disease with esophagitis       sucralfate 1 GM tablet    CARAFATE    42 tablet    Take 1 tablet (1 g) by mouth 4 times daily    Erosive gastritis       SUMAtriptan 25 MG tablet    IMITREX    9 tablet    Take 1-2 tablets (25-50 mg) by mouth at onset of headache for migraine May repeat dose in 2 hours.  Do not exceed 200 mg in 24 hours    Migraine without aura and without status migrainosus, not intractable       traMADol 50 MG tablet    ULTRAM    20 tablet    Take 1-2 tablets ( mg) by mouth every 6 hours as needed for pain maximum 8 tablet(s) per day        triamcinolone 0.1 % cream    KENALOG    30 g    Apply sparingly to affected area two times daily for 10- 14 days.    Eczema, unspecified type       * Notice:  This list has 2 medication(s) that are the same as other medications prescribed for you. Read the directions carefully, and ask your doctor or other care provider to review them with you.

## 2018-01-19 NOTE — NURSING NOTE
"Chief Complaint   Patient presents with     URI       Initial /70 (BP Location: Right arm, Patient Position: Sitting, Cuff Size: Adult Regular)  Pulse 121  Temp 99.6  F (37.6  C) (Tympanic)  Ht 4' 10.75\" (1.492 m)  Wt 164 lb 9.6 oz (74.7 kg)  LMP 12/08/2017 (Approximate)  SpO2 99%  BMI 33.53 kg/m2 Estimated body mass index is 33.53 kg/(m^2) as calculated from the following:    Height as of this encounter: 4' 10.75\" (1.492 m).    Weight as of this encounter: 164 lb 9.6 oz (74.7 kg).  Medication Reconciliation: complete     April [facility] Coreas      "

## 2018-01-20 ENCOUNTER — HEALTH MAINTENANCE LETTER (OUTPATIENT)
Age: 33
End: 2018-01-20

## 2018-01-20 LAB
BACTERIA SPEC CULT: NORMAL
SPECIMEN SOURCE: NORMAL

## 2018-01-23 RX ORDER — NORGESTIMATE AND ETHINYL ESTRADIOL 0.25-0.035
1 KIT ORAL DAILY
Qty: 84 TABLET | Refills: 3 | Status: SHIPPED | OUTPATIENT
Start: 2018-01-23

## 2018-01-23 NOTE — TELEPHONE ENCOUNTER
Prescription approved per [facility] [place] or patient Primary care provider (PCP)  NICK Villalta RN/Akil Mckenzie

## 2018-02-07 ENCOUNTER — TRANSFERRED RECORDS (OUTPATIENT)
Dept: HEALTH INFORMATION MANAGEMENT | Facility: CLINIC | Age: 33
End: 2018-02-07

## 2018-02-07 ENCOUNTER — TELEPHONE (OUTPATIENT)
Dept: FAMILY MEDICINE | Facility: CLINIC | Age: 33
End: 2018-02-07

## 2018-02-07 DIAGNOSIS — K21.00 GASTROESOPHAGEAL REFLUX DISEASE WITH ESOPHAGITIS: ICD-10-CM

## 2018-02-07 DIAGNOSIS — R11.0 NAUSEA: ICD-10-CM

## 2018-02-08 ENCOUNTER — TELEPHONE (OUTPATIENT)
Dept: FAMILY MEDICINE | Facility: CLINIC | Age: 33
End: 2018-02-08

## 2018-02-08 DIAGNOSIS — M79.10 MYALGIA: ICD-10-CM

## 2018-02-08 RX ORDER — OXYCODONE AND ACETAMINOPHEN 5; 325 MG/1; MG/1
1-2 TABLET ORAL EVERY 4 HOURS PRN
Qty: 30 TABLET | Refills: 0 | Status: CANCELLED | OUTPATIENT
Start: 2018-02-08

## 2018-02-08 NOTE — TELEPHONE ENCOUNTER
Patient saw a specialist - Dr. Christiansen at [facility] [place] - yesterday who gave her prednisone and tylenol with codeine. The tylenol with codeine is making the patient sick. She was up all night puking. Patient is wondering if Magaly Simon could refill her percocet instead? Patient is also concerned about the length of time she is supposed to take the prednisone. In total, the patient would be on prednisone for about 4 months - initially starting with 3 tabs per day.    Patient also would like Magaly to know she refuses to go back to Dr. Christiansen.    Emily does have a follow up appointment scheduled with Magaly tomorrow 2-9.    Liyah Verner   [facility]

## 2018-02-08 NOTE — TELEPHONE ENCOUNTER
"Requested Prescriptions   Pending Prescriptions Disp Refills     metoclopramide (REGLAN) 10 MG tablet [Pharmacy Med Name: METOCLOPRAMIDE HCL 10MG TABS] 60 tablet 1    Last Written Prescription Date:  05/15/2017 #60 x 1  Last filled 10/17/2017  Last Office Visit with [facility] provider:  01/19/2018 CHARY Michele   Future Office Visit:    Next 5 appointments (look out 90 days)     Feb 09, 2018  2:20 PM CST   SHORT with Magaly Simon PA-C   [facility] ([facility]    [address] [id number] [phone]                  Sig: TAKE ONE TABLET BY MOUTH FOUR TIMES A DAY AS NEEDED     Antivertigo/Antiemetic Agents Passed    2/7/2018  4:38 PM       Passed - Recent or future visit with authorizing provider's specialty    Patient had office visit in the last year or has a visit in the next 30 days with authorizing provider.  See \"Patient Info\" tab in inbasket, or \"Choose Columns\" in Meds & Orders section of the refill encounter.            Passed - Patient is 18 years of age or older          "

## 2018-02-08 NOTE — TELEPHONE ENCOUNTER
Please obtain Dr. Christiansen's note so I can review before her appointment tomorrow (I placed the referral, so we should be able to get records).  We will discuss the percocet refill at her appointment.  Magaly Simon PA-C

## 2018-02-08 NOTE — TELEPHONE ENCOUNTER
Wait for appointment tomorrow since patient refuses to go back to prescribing doctor? Genesis Ramírez RN

## 2018-02-09 DIAGNOSIS — R70.0 ELEVATED SEDIMENTATION RATE: Primary | ICD-10-CM

## 2018-02-09 DIAGNOSIS — R79.82 ELEVATED C-REACTIVE PROTEIN (CRP): ICD-10-CM

## 2018-02-09 DIAGNOSIS — M79.10 MYALGIA: ICD-10-CM

## 2018-02-09 PROCEDURE — 86235 NUCLEAR ANTIGEN ANTIBODY: CPT | Performed by: INTERNAL MEDICINE

## 2018-02-09 PROCEDURE — 86038 ANTINUCLEAR ANTIBODIES: CPT | Performed by: INTERNAL MEDICINE

## 2018-02-09 PROCEDURE — 36415 COLL VENOUS BLD VENIPUNCTURE: CPT | Performed by: INTERNAL MEDICINE

## 2018-02-09 PROCEDURE — 86431 RHEUMATOID FACTOR QUANT: CPT | Performed by: INTERNAL MEDICINE

## 2018-02-09 PROCEDURE — 86200 CCP ANTIBODY: CPT | Performed by: INTERNAL MEDICINE

## 2018-02-09 RX ORDER — METOCLOPRAMIDE 10 MG/1
TABLET ORAL
Qty: 60 TABLET | Refills: 0 | Status: SHIPPED | OUTPATIENT
Start: 2018-02-09

## 2018-02-09 NOTE — TELEPHONE ENCOUNTER
Patient cancelled appt, I refilled the Reglan, she should f/u for a recheck for further refills of her meds (including pain meds)  Magaly Simon PA-C

## 2018-02-09 NOTE — TELEPHONE ENCOUNTER
I called the arthritis clinic this morning @ 427 they do not have notes ready for about one week because patient was only seen 2/8/2018. We did get her lab work back.    Sarita Dailey MA

## 2018-02-11 LAB — ENA SCL70 IGG SER IA-ACNC: <0.2 AI (ref 0–0.9)

## 2018-02-12 LAB
ANA SER QL IF: NEGATIVE
CCP AB SER IA-ACNC: 1 U/ML
RHEUMATOID FACT SER NEPH-ACNC: <20 IU/ML (ref 0–20)

## 2018-02-12 NOTE — TELEPHONE ENCOUNTER
Just spoke with patient. She is requesting a fill of percocet. She said Tylenol 3 made her sick and she had dropped off all paperwork as requested at the  and no one had any clue what it was for.  She also mentioned she dropped off a second copy with lab when she went for labs on Friday.    She's curious as to where all of her paperwork ended up and would like a call if the prescription is approved or denied.    Please contact patient    Thanks!  Lisa Barlow CPhT  [id number])  [address] [place] [address]  Phone: [phone]  Fax: [phone]

## 2018-02-13 NOTE — TELEPHONE ENCOUNTER
Please check with Sandra or Dagmar to find out if they received any paperwork.  Please check with Sarita or look in my basket to see if we have the paperwork there.    Please call patient, I will not refill percocet for her, this is not a long term medication I recommend and I would like to see what the specialist said (I was not in the office today).     She can schedule a f/u visit if she would like to discuss alternative pain management options.      Magaly Simon PA-C

## 2018-02-14 NOTE — TELEPHONE ENCOUNTER
I talked to Emily today she stated that she only left the labs here with the  which are in our records. She did see the rheumatologist and does not want to go back. I talked with the rheumatology clinic and they stated that her records would be sent over as soon as they receive them.   Pt would also like a referral for a different doctor.  Sarita Dailey MA

## 2018-02-16 NOTE — TELEPHONE ENCOUNTER
Called arthritis clinic told them we need visit notes, we did receive lab orders but need the actual OV notes. Was told that they would get the message to medical records.  Charmaine Vazquez CMA([facility])

## 2018-02-16 NOTE — TELEPHONE ENCOUNTER
Called and spoke with Emily. She will call Monday to set up f/u OV.  Charmaine Vazquez CMA([facility])

## 2018-02-16 NOTE — TELEPHONE ENCOUNTER
Please call Emily, we have called over to the Rheumatology clinic several times, however it can take time for the notes to be signed/finalized.      I have reviewed the labs, which were normal (good sign).      I suggest she schedule a f/u visit to discuss things further.      Magaly Simon PA-C

## 2018-02-16 NOTE — TELEPHONE ENCOUNTER
"Patient calling upset that no one has called her back. Patient is wondering if her records from the rheumatologist were received? Patient states \"I want to know what is going on with me?\"    Patient also upset that he lab work was not at the  like she requested. I printed off the lab flowsheets for her to  later today.    Liyah Covington   [facility] [place]       "

## 2018-02-26 NOTE — TELEPHONE ENCOUNTER
Reviewed Rheumatologist's note, Dr. Christiansen ([facility] [place])    A/P:  H/o polyarthalgia/myalgia  Patient not tolerant to NSAIDs due to GI upset.  Recommended prednisone 15 mg and taper by 2.5 mg.  Running out of percocet, but this was not advised.  Tylenol with codeine offered as she has not tolerated tramadol in the past.      She will f/u with me to discuss further (pain clinic may be a good option)  Magaly Simon PA-C

## 2018-05-07 ENCOUNTER — HOSPITAL ENCOUNTER (EMERGENCY)
Facility: CLINIC | Age: 33
Discharge: LEFT BEFORE DISCHARGE | End: 2018-05-08
Payer: COMMERCIAL

## 2018-05-07 VITALS
TEMPERATURE: 99 F | SYSTOLIC BLOOD PRESSURE: 117 MMHG | HEART RATE: 115 BPM | OXYGEN SATURATION: 100 % | DIASTOLIC BLOOD PRESSURE: 63 MMHG

## 2021-05-26 ENCOUNTER — RECORDS - HEALTHEAST (OUTPATIENT)
Dept: ADMINISTRATIVE | Facility: CLINIC | Age: 36
End: 2021-05-26

## 2021-11-29 ENCOUNTER — IMMUNIZATION (OUTPATIENT)
Dept: FAMILY MEDICINE | Facility: CLINIC | Age: 36
End: 2021-11-29
Payer: COMMERCIAL

## 2021-11-29 DIAGNOSIS — Z23 NEED FOR PROPHYLACTIC VACCINATION AND INOCULATION AGAINST INFLUENZA: Primary | ICD-10-CM

## 2021-11-29 PROCEDURE — 99207 PR NO CHARGE NURSE ONLY: CPT

## 2021-11-29 PROCEDURE — 90686 IIV4 VACC NO PRSV 0.5 ML IM: CPT

## 2021-11-29 PROCEDURE — 90471 IMMUNIZATION ADMIN: CPT

## 2024-04-12 ENCOUNTER — THERAPY VISIT (OUTPATIENT)
Dept: PHYSICAL THERAPY | Facility: CLINIC | Age: 39
End: 2024-04-12
Payer: MEDICAID

## 2024-04-12 DIAGNOSIS — M25.562 ACUTE PAIN OF LEFT KNEE: Primary | ICD-10-CM

## 2024-04-12 DIAGNOSIS — R60.0 LOCALIZED EDEMA: ICD-10-CM

## 2024-04-12 DIAGNOSIS — S83.512D RUPTURE OF ANTERIOR CRUCIATE LIGAMENT OF LEFT KNEE, SUBSEQUENT ENCOUNTER: ICD-10-CM

## 2024-04-12 PROBLEM — S83.512A LEFT ACL TEAR: Status: ACTIVE | Noted: 2024-04-12

## 2024-04-12 PROCEDURE — 97016 VASOPNEUMATIC DEVICE THERAPY: CPT | Mod: GP | Performed by: PHYSICAL THERAPIST

## 2024-04-12 PROCEDURE — 97161 PT EVAL LOW COMPLEX 20 MIN: CPT | Mod: GP | Performed by: PHYSICAL THERAPIST

## 2024-04-12 PROCEDURE — 97110 THERAPEUTIC EXERCISES: CPT | Mod: GP | Performed by: PHYSICAL THERAPIST

## 2024-04-12 ASSESSMENT — ACTIVITIES OF DAILY LIVING (ADL)
SQUAT: I AM UNABLE TO DO THE ACTIVITY
PAIN: THE SYMPTOM AFFECTS MY ACTIVITY MODERATELY
LIMPING: THE SYMPTOM AFFECTS MY ACTIVITY SEVERELY
WEAKNESS: THE SYMPTOM AFFECTS MY ACTIVITY SLIGHTLY
AS_A_RESULT_OF_YOUR_KNEE_INJURY,_HOW_WOULD_YOU_RATE_YOUR_CURRENT_LEVEL_OF_DAILY_ACTIVITY?: ABNORMAL
SIT WITH YOUR KNEE BENT: ACTIVITY IS MINIMALLY DIFFICULT
RAW_SCORE: 33
WALK: ACTIVITY IS SOMEWHAT DIFFICULT
GO DOWN STAIRS: ACTIVITY IS FAIRLY DIFFICULT
RISE FROM A CHAIR: ACTIVITY IS SOMEWHAT DIFFICULT
STIFFNESS: THE SYMPTOM AFFECTS MY ACTIVITY SLIGHTLY
GO UP STAIRS: ACTIVITY IS FAIRLY DIFFICULT
STAND: ACTIVITY IS MINIMALLY DIFFICULT
SWELLING: I HAVE THE SYMPTOM BUT IT DOES NOT AFFECT MY ACTIVITY
GIVING WAY, BUCKLING OR SHIFTING OF KNEE: THE SYMPTOM AFFECTS MY ACTIVITY MODERATELY
KNEEL ON THE FRONT OF YOUR KNEE: I AM UNABLE TO DO THE ACTIVITY
KNEE_ACTIVITY_OF_DAILY_LIVING_SUM: 33
HOW_WOULD_YOU_RATE_THE_OVERALL_FUNCTION_OF_YOUR_KNEE_DURING_YOUR_USUAL_DAILY_ACTIVITIES?: ABNORMAL
HOW_WOULD_YOU_RATE_THE_CURRENT_FUNCTION_OF_YOUR_KNEE_DURING_YOUR_USUAL_DAILY_ACTIVITIES_ON_A_SCALE_FROM_0_TO_100_WITH_100_BEING_YOUR_LEVEL_OF_KNEE_FUNCTION_PRIOR_TO_YOUR_INJURY_AND_0_BEING_THE_INABILITY_TO_PERFORM_ANY_OF_YOUR_USUAL_DAILY_ACTIVITIES?: 50
KNEE_ACTIVITY_OF_DAILY_LIVING_SCORE: 47.14

## 2024-04-12 NOTE — PROGRESS NOTES
[facility]                                                                                   OUTPATIENT PHYSICAL THERAPY    PLAN OF TREATMENT FOR OUTPATIENT REHABILITATION   Patient's Last Name, First Name, Emily Benites YOB: 1985   Provider's Name   [facility]   Medical Record No.  [id number]     Onset Date: 03/26/24  Start of Care Date: 04/12/24     Medical Diagnosis:  LEFT KNEE PAIN, LEFT ACL TEAR, LEFT MCL TEAR      PT Treatment Diagnosis:  LEFT KNEE PAIN, LEFT ACL TEAR, LEFT MCL TEAR Plan of Treatment  Frequency/Duration: 1X/WEEK/ 2 WEEKS    Certification date from 04/12/24 to 04/26/24         See note for plan of treatment details and functional goals     Juan Manuel Antunez PT                         I CERTIFY THE NEED FOR THESE SERVICES FURNISHED UNDER        THIS PLAN OF TREATMENT AND WHILE UNDER MY CARE .             Physician Signature               Date    X_____________________________________________________                  Referring Provider:  Cecelia Cobian    Initial Assessment  See Epic Evaluation- Start of Care Date: 04/12/24            PLAN  Pre-op PT to focus on ROM, quad function, gait.    Beginning/End Dates of Progress Note Reporting Period:    04/12/2024 to 04/12/2024    Referring Provider:  Cecelia Cobian       04/12/24 0500   Appointment Info   Signing clinician's name / credentials Nikolas Harmony, DPT, SCS   Total/Authorized Visits 3 pre-op visits  (INCLUDING EVAL)   Visits Used 1   Medical Diagnosis LEFT KNEE PAIN, LEFT ACL TEAR, LEFT MCL TEAR   PT Tx Diagnosis LEFT KNEE PAIN, LEFT ACL TEAR, LEFT MCL TEAR   Quick Adds Certification   Progress Note/Certification   Start of Care Date 04/12/24   Onset of illness/injury or Date of Surgery 03/26/24   Therapy Frequency 1X/WEEK   Predicted Duration 2 WEEKS   Certification date from 04/12/24   Certification date to 04/26/24   Progress Note Due Date 04/26/24  "  PT Goal 1   Goal Identifier Ambulation   Goal Description Patient will be able to walk >500 feet with normal gait.   Rationale to maximize safety and independence with performance of ADLs and functional tasks;to maximize safety and independence within the home;to maximize safety and independence within the community;to maximize safety and independence with self cares   Goal Progress Walks with limp   Target Date 04/26/24   Subjective Report   Subjective Report ie   Objective Measures   Objective Measures Objective Measure 1  (ie)   Objective Measure 1   Objective Measure ROM   Details Left knee PROM:  0-3-60  (improved to 1-0-77 during session)   PT Modalities   PT Modalities Vasopneumatic device   Vasopneumatic Device   Vasopneumatic Pressure low   Vasopneumatic device minutes (19092) 10   Patient Response/Progress decreased pain   Duration 10 min   Temperature 34 degrees   Treatment Interventions (PT)   Interventions Therapeutic Procedure/Exercise   Therapeutic Procedure/Exercise   Therapeutic Procedures: strength, endurance, ROM, flexibility minutes (85297) 23   Ther Proc 1 quad set   Ther Proc 1 - Details 5\" hold x10   Therapeutic Procedures Ther Proc 2;Ther Proc 3;Ther Proc 4;Ther Proc 5;Ther Proc 6   Skilled Intervention exercise selection, instruction, patient education   Patient Response/Progress increased ROM, some pain   Ther Proc 2 heel prop   Ther Proc 2 - Details x2 min passive extension   Ther Proc 3 heel slides   Ther Proc 3 - Details 10\" hold x10   Ther Proc 4 supine SLR   Ther Proc 4 - Details ag x8-10 with min assist for first 5   Ther Proc 5 SLR abduction   Ther Proc 5 - Details sidelying ag x10   Ther Proc 6 prone SLR extension   Ther Proc 6 - Details x10   Eval/Assessments   PT Eval, Low Complexity Minutes (38055) 13   Education   Learner/Method Patient;Pictures/Video   Plan   Home program PTRX   Plan for next session ROM, gait   Total Session Time   Timed Code Treatment Minutes 23   Total " Treatment Time (sum of timed and untimed services) 46        MDD (major depressive disorder)
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
## 2024-04-17 ENCOUNTER — THERAPY VISIT (OUTPATIENT)
Dept: PHYSICAL THERAPY | Facility: CLINIC | Age: 39
End: 2024-04-17
Payer: MEDICAID

## 2024-04-17 DIAGNOSIS — R60.0 LOCALIZED EDEMA: ICD-10-CM

## 2024-04-17 DIAGNOSIS — M25.562 ACUTE PAIN OF LEFT KNEE: Primary | ICD-10-CM

## 2024-04-17 DIAGNOSIS — S83.512D RUPTURE OF ANTERIOR CRUCIATE LIGAMENT OF LEFT KNEE, SUBSEQUENT ENCOUNTER: ICD-10-CM

## 2024-04-17 PROCEDURE — 97016 VASOPNEUMATIC DEVICE THERAPY: CPT | Mod: GP | Performed by: STUDENT IN AN ORGANIZED HEALTH CARE EDUCATION/TRAINING PROGRAM

## 2024-04-17 PROCEDURE — 97110 THERAPEUTIC EXERCISES: CPT | Mod: GP | Performed by: STUDENT IN AN ORGANIZED HEALTH CARE EDUCATION/TRAINING PROGRAM

## 2024-04-17 PROCEDURE — 97112 NEUROMUSCULAR REEDUCATION: CPT | Mod: GP | Performed by: STUDENT IN AN ORGANIZED HEALTH CARE EDUCATION/TRAINING PROGRAM

## 2024-04-23 ENCOUNTER — THERAPY VISIT (OUTPATIENT)
Dept: PHYSICAL THERAPY | Facility: CLINIC | Age: 39
End: 2024-04-23
Payer: MEDICAID

## 2024-04-23 DIAGNOSIS — M25.562 ACUTE PAIN OF LEFT KNEE: Primary | ICD-10-CM

## 2024-04-23 DIAGNOSIS — R60.0 LOCALIZED EDEMA: ICD-10-CM

## 2024-04-23 DIAGNOSIS — S83.512D RUPTURE OF ANTERIOR CRUCIATE LIGAMENT OF LEFT KNEE, SUBSEQUENT ENCOUNTER: ICD-10-CM

## 2024-04-23 PROCEDURE — 97110 THERAPEUTIC EXERCISES: CPT | Mod: GP | Performed by: PHYSICAL THERAPIST

## 2024-04-23 PROCEDURE — 97112 NEUROMUSCULAR REEDUCATION: CPT | Mod: GP | Performed by: PHYSICAL THERAPIST

## 2024-04-23 PROCEDURE — 97016 VASOPNEUMATIC DEVICE THERAPY: CPT | Mod: GP | Performed by: PHYSICAL THERAPIST

## 2024-04-23 ASSESSMENT — ACTIVITIES OF DAILY LIVING (ADL)
PAIN: I HAVE THE SYMPTOM BUT IT DOES NOT AFFECT MY ACTIVITY
HOW_WOULD_YOU_RATE_THE_CURRENT_FUNCTION_OF_YOUR_KNEE_DURING_YOUR_USUAL_DAILY_ACTIVITIES_ON_A_SCALE_FROM_0_TO_100_WITH_100_BEING_YOUR_LEVEL_OF_KNEE_FUNCTION_PRIOR_TO_YOUR_INJURY_AND_0_BEING_THE_INABILITY_TO_PERFORM_ANY_OF_YOUR_USUAL_DAILY_ACTIVITIES?: 90
GO UP STAIRS: ACTIVITY IS MINIMALLY DIFFICULT
RISE FROM A CHAIR: ACTIVITY IS NOT DIFFICULT
LIMPING: I HAVE THE SYMPTOM BUT IT DOES NOT AFFECT MY ACTIVITY
GO DOWN STAIRS: ACTIVITY IS SOMEWHAT DIFFICULT
AS_A_RESULT_OF_YOUR_KNEE_INJURY,_HOW_WOULD_YOU_RATE_YOUR_CURRENT_LEVEL_OF_DAILY_ACTIVITY?: NEARLY NORMAL
KNEE_ACTIVITY_OF_DAILY_LIVING_SCORE: 78.57
SWELLING: I HAVE THE SYMPTOM BUT IT DOES NOT AFFECT MY ACTIVITY
STAND: ACTIVITY IS NOT DIFFICULT
RAW_SCORE: 55
KNEE_ACTIVITY_OF_DAILY_LIVING_SUM: 55
STIFFNESS: THE SYMPTOM AFFECTS MY ACTIVITY SLIGHTLY
WEAKNESS: I HAVE THE SYMPTOM BUT IT DOES NOT AFFECT MY ACTIVITY
SQUAT: ACTIVITY IS SOMEWHAT DIFFICULT
GIVING WAY, BUCKLING OR SHIFTING OF KNEE: THE SYMPTOM AFFECTS MY ACTIVITY SLIGHTLY
KNEEL ON THE FRONT OF YOUR KNEE: ACTIVITY IS SOMEWHAT DIFFICULT
SIT WITH YOUR KNEE BENT: ACTIVITY IS NOT DIFFICULT
WALK: ACTIVITY IS NOT DIFFICULT
HOW_WOULD_YOU_RATE_THE_OVERALL_FUNCTION_OF_YOUR_KNEE_DURING_YOUR_USUAL_DAILY_ACTIVITIES?: NEARLY NORMAL

## 2024-04-23 NOTE — PROGRESS NOTES
04/23/24 0500   Appointment Info   Signing clinician's name / credentials Nikolas Asuma, DPT, SCS   Total/Authorized Visits 3 pre-op visits  (INCLUDING EVAL  )   Visits Used 3   Medical Diagnosis LEFT KNEE PAIN, LEFT ACL TEAR, LEFT MCL TEAR   PT Tx Diagnosis LEFT KNEE PAIN, LEFT ACL TEAR, LEFT MCL TEAR   Quick Adds Certification   Progress Note/Certification   Start of Care Date 04/12/24   Onset of illness/injury or Date of Surgery 03/26/24   Therapy Frequency 1X/WEEK   Predicted Duration 2 WEEKS   Certification date from 04/12/24   Certification date to 04/26/24   Progress Note Due Date 04/26/24   PT Goal 1   Goal Identifier Ambulation   Goal Description Patient will be able to walk >500 feet with normal gait.   Rationale to maximize safety and independence with performance of ADLs and functional tasks;to maximize safety and independence within the home;to maximize safety and independence within the community;to maximize safety and independence with self cares   Goal Progress gait near normal, minimal to no pain walking >500 feet   Target Date 04/26/24   Date Met 04/23/24   Subjective Report   Subjective Report Emily reports her knee is moving better and she can walk better.  She still has pain and instability but her quad muscle engages better and the swelling is decreased.  She is cleared to undergo surgery for her ACL and MCL on 4/25/24.   Objective Measures   Objective Measures Objective Measure 1;Objective Measure 2  (ie)   Objective Measure 1   Objective Measure ROM   Details 4-0-130   Objective Measure 2   Objective Measure Gait   Details normal heel strike and knee mechanics, mild limp intermittently   PT Modalities   PT Modalities Vasopneumatic device   Vasopneumatic Device   Vasopneumatic device minutes (09899) 10   Vasopneumatic Pressure low   Duration 10 min   Temperature 34 degrees   Patient Response/Progress decreased pain   Treatment Interventions (PT)   Interventions Therapeutic  "Procedure/Exercise;Neuromuscular Re-education   Therapeutic Procedure/Exercise   Therapeutic Procedures: strength, endurance, ROM, flexibility minutes (02866) 30   Ther Proc 1 bike SH 1   Ther Proc 1 - Details x 7 min   Ther Proc 3 heel slides   Ther Proc 3 - Details 10\" hold x10   Ther Proc 4 supine SLR   Ther Proc 4 - Details 1 lb x 20-30   Ther Proc 5 SLR abduction   Ther Proc 5 - Details sidelying 1 lb x20   Ther Proc 6 prone SLR extension   Ther Proc 6 - Details 1 lb x30   Ther Proc 7 lateral band walk   Ther Proc 7 - Details red band RENO x4   Ther Proc 8 clamshell   Ther Proc 8 - Details red band x30   Skilled Intervention exercise selection, instruction, patient education   Patient Response/Progress increased ROM, no pain   Neuromuscular Re-education   Neuromuscular re-ed of mvmt, balance, coord, kinesthetic sense, posture, proprioception minutes (30994) 10   Neuromuscular Re-education Neuro Re-ed 2   Neuro Re-ed 1 SL balance UE unsupported   Neuro Re-ed 1 - Details 3 x 30\"   Neuro Re-ed 2 standing TKE green band   Neuro Re-ed 2 - Details x 30 reps with ankle DF for quad control   Skilled Intervention exercise selection and instruction   Patient Response/Progress good quad activation   Education   Learner/Method Patient;Pictures/Video   Plan   Updates to plan of care d/c from pre-op PT   Total Session Time   Timed Code Treatment Minutes 40   Total Treatment Time (sum of timed and untimed services) 50         DISCHARGE  Reason for Discharge: The patient has met her pre-op PT goals and has achieved goals set by her surgeon to undergo surgery on 4/25/24.    Equipment Issued: none    Discharge Plan: Patient will undergo surgery for ACL/MCL reconstruction on 4/25/24.  She will then start post-op PT on 4/29/24 with a new plan of care and new goals.    Referring Provider:  Cecelia winston"

## 2024-05-02 ENCOUNTER — THERAPY VISIT (OUTPATIENT)
Dept: PHYSICAL THERAPY | Facility: CLINIC | Age: 39
End: 2024-05-02
Payer: COMMERCIAL

## 2024-05-02 DIAGNOSIS — Z87.39 S/P ARTHROSCOPIC RECONSTRUCTION OF ACL OF LEFT KNEE USING QUADRICEPS TENDON AUTOGRAFT: Primary | ICD-10-CM

## 2024-05-02 DIAGNOSIS — Z98.890 S/P ARTHROSCOPIC RECONSTRUCTION OF ACL OF LEFT KNEE USING QUADRICEPS TENDON AUTOGRAFT: Primary | ICD-10-CM

## 2024-05-02 PROCEDURE — 97161 PT EVAL LOW COMPLEX 20 MIN: CPT | Mod: GP | Performed by: PHYSICAL THERAPIST

## 2024-05-02 PROCEDURE — 97110 THERAPEUTIC EXERCISES: CPT | Mod: GP | Performed by: PHYSICAL THERAPIST

## 2024-05-02 PROCEDURE — 97010 HOT OR COLD PACKS THERAPY: CPT | Mod: GP | Performed by: PHYSICAL THERAPIST

## 2024-05-02 ASSESSMENT — ACTIVITIES OF DAILY LIVING (ADL)
GIVING WAY, BUCKLING OR SHIFTING OF KNEE: THE SYMPTOM PREVENTS ME FROM ALL DAILY ACTIVITIES
LIMPING: THE SYMPTOM PREVENTS ME FROM ALL DAILY ACTIVITIES
GO DOWN STAIRS: I AM UNABLE TO DO THE ACTIVITY
KNEE_ACTIVITY_OF_DAILY_LIVING_SUM: 8
SIT WITH YOUR KNEE BENT: I AM UNABLE TO DO THE ACTIVITY
RAW_SCORE: 8
WEAKNESS: THE SYMPTOM PREVENTS ME FROM ALL DAILY ACTIVITIES
HOW_WOULD_YOU_RATE_THE_CURRENT_FUNCTION_OF_YOUR_KNEE_DURING_YOUR_USUAL_DAILY_ACTIVITIES_ON_A_SCALE_FROM_0_TO_100_WITH_100_BEING_YOUR_LEVEL_OF_KNEE_FUNCTION_PRIOR_TO_YOUR_INJURY_AND_0_BEING_THE_INABILITY_TO_PERFORM_ANY_OF_YOUR_USUAL_DAILY_ACTIVITIES?: 10
HOW_WOULD_YOU_RATE_THE_OVERALL_FUNCTION_OF_YOUR_KNEE_DURING_YOUR_USUAL_DAILY_ACTIVITIES?: SEVERELY ABNORMAL
RISE FROM A CHAIR: ACTIVITY IS VERY DIFFICULT
SWELLING: THE SYMPTOM AFFECTS MY ACTIVITY MODERATELY
KNEEL ON THE FRONT OF YOUR KNEE: I AM UNABLE TO DO THE ACTIVITY
GO UP STAIRS: I AM UNABLE TO DO THE ACTIVITY
STAND: ACTIVITY IS FAIRLY DIFFICULT
PAIN: THE SYMPTOM AFFECTS MY ACTIVITY SEVERELY
KNEE_ACTIVITY_OF_DAILY_LIVING_SCORE: 11.43
AS_A_RESULT_OF_YOUR_KNEE_INJURY,_HOW_WOULD_YOU_RATE_YOUR_CURRENT_LEVEL_OF_DAILY_ACTIVITY?: SEVERELY ABNORMAL
SQUAT: I AM UNABLE TO DO THE ACTIVITY
WALK: ACTIVITY IS VERY DIFFICULT
STIFFNESS: THE SYMPTOM AFFECTS MY ACTIVITY SEVERELY
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
## 2024-05-09 ENCOUNTER — THERAPY VISIT (OUTPATIENT)
Dept: PHYSICAL THERAPY | Facility: CLINIC | Age: 39
End: 2024-05-09
Payer: COMMERCIAL

## 2024-05-09 DIAGNOSIS — M25.562 ACUTE PAIN OF LEFT KNEE: Primary | ICD-10-CM

## 2024-05-09 DIAGNOSIS — Z87.39 S/P ARTHROSCOPIC RECONSTRUCTION OF ACL OF LEFT KNEE USING QUADRICEPS TENDON AUTOGRAFT: ICD-10-CM

## 2024-05-09 DIAGNOSIS — Z98.890 S/P ARTHROSCOPIC RECONSTRUCTION OF ACL OF LEFT KNEE USING QUADRICEPS TENDON AUTOGRAFT: ICD-10-CM

## 2024-05-09 DIAGNOSIS — S83.512D RUPTURE OF ANTERIOR CRUCIATE LIGAMENT OF LEFT KNEE, SUBSEQUENT ENCOUNTER: ICD-10-CM

## 2024-05-09 DIAGNOSIS — R60.0 LOCALIZED EDEMA: ICD-10-CM

## 2024-05-09 PROCEDURE — 97112 NEUROMUSCULAR REEDUCATION: CPT | Mod: GP | Performed by: PHYSICAL THERAPIST

## 2024-05-09 PROCEDURE — 97110 THERAPEUTIC EXERCISES: CPT | Mod: GP | Performed by: PHYSICAL THERAPIST

## 2024-05-14 ENCOUNTER — THERAPY VISIT (OUTPATIENT)
Dept: PHYSICAL THERAPY | Facility: CLINIC | Age: 39
End: 2024-05-14
Payer: COMMERCIAL

## 2024-05-14 DIAGNOSIS — M25.562 ACUTE PAIN OF LEFT KNEE: Primary | ICD-10-CM

## 2024-05-14 DIAGNOSIS — S83.512D RUPTURE OF ANTERIOR CRUCIATE LIGAMENT OF LEFT KNEE, SUBSEQUENT ENCOUNTER: ICD-10-CM

## 2024-05-14 DIAGNOSIS — R60.0 LOCALIZED EDEMA: ICD-10-CM

## 2024-05-14 DIAGNOSIS — Z98.890 S/P ARTHROSCOPIC RECONSTRUCTION OF ACL OF LEFT KNEE USING QUADRICEPS TENDON AUTOGRAFT: ICD-10-CM

## 2024-05-14 DIAGNOSIS — Z87.39 S/P ARTHROSCOPIC RECONSTRUCTION OF ACL OF LEFT KNEE USING QUADRICEPS TENDON AUTOGRAFT: ICD-10-CM

## 2024-05-14 PROCEDURE — 97010 HOT OR COLD PACKS THERAPY: CPT | Mod: GP | Performed by: PHYSICAL THERAPIST

## 2024-05-14 PROCEDURE — 97112 NEUROMUSCULAR REEDUCATION: CPT | Mod: GP | Performed by: PHYSICAL THERAPIST

## 2024-05-14 PROCEDURE — 97110 THERAPEUTIC EXERCISES: CPT | Mod: GP | Performed by: PHYSICAL THERAPIST

## 2024-05-16 ENCOUNTER — THERAPY VISIT (OUTPATIENT)
Dept: PHYSICAL THERAPY | Facility: CLINIC | Age: 39
End: 2024-05-16
Payer: COMMERCIAL

## 2024-05-16 DIAGNOSIS — Z98.890 S/P ARTHROSCOPIC RECONSTRUCTION OF ACL OF LEFT KNEE USING QUADRICEPS TENDON AUTOGRAFT: ICD-10-CM

## 2024-05-16 DIAGNOSIS — S83.512D RUPTURE OF ANTERIOR CRUCIATE LIGAMENT OF LEFT KNEE, SUBSEQUENT ENCOUNTER: ICD-10-CM

## 2024-05-16 DIAGNOSIS — M25.562 ACUTE PAIN OF LEFT KNEE: Primary | ICD-10-CM

## 2024-05-16 DIAGNOSIS — R60.0 LOCALIZED EDEMA: ICD-10-CM

## 2024-05-16 DIAGNOSIS — Z87.39 S/P ARTHROSCOPIC RECONSTRUCTION OF ACL OF LEFT KNEE USING QUADRICEPS TENDON AUTOGRAFT: ICD-10-CM

## 2024-05-16 PROCEDURE — 97010 HOT OR COLD PACKS THERAPY: CPT | Mod: GP | Performed by: PHYSICAL THERAPIST

## 2024-05-16 PROCEDURE — 97112 NEUROMUSCULAR REEDUCATION: CPT | Mod: GP | Performed by: PHYSICAL THERAPIST

## 2024-05-16 PROCEDURE — 97110 THERAPEUTIC EXERCISES: CPT | Mod: GP | Performed by: PHYSICAL THERAPIST

## 2024-05-21 ENCOUNTER — THERAPY VISIT (OUTPATIENT)
Dept: PHYSICAL THERAPY | Facility: CLINIC | Age: 39
End: 2024-05-21
Payer: COMMERCIAL

## 2024-05-21 DIAGNOSIS — M25.562 ACUTE PAIN OF LEFT KNEE: Primary | ICD-10-CM

## 2024-05-21 DIAGNOSIS — S83.512D RUPTURE OF ANTERIOR CRUCIATE LIGAMENT OF LEFT KNEE, SUBSEQUENT ENCOUNTER: ICD-10-CM

## 2024-05-21 DIAGNOSIS — R60.0 LOCALIZED EDEMA: ICD-10-CM

## 2024-05-21 DIAGNOSIS — Z87.39 S/P ARTHROSCOPIC RECONSTRUCTION OF ACL OF LEFT KNEE USING QUADRICEPS TENDON AUTOGRAFT: ICD-10-CM

## 2024-05-21 DIAGNOSIS — Z98.890 S/P ARTHROSCOPIC RECONSTRUCTION OF ACL OF LEFT KNEE USING QUADRICEPS TENDON AUTOGRAFT: ICD-10-CM

## 2024-05-21 PROCEDURE — 97112 NEUROMUSCULAR REEDUCATION: CPT | Mod: GP | Performed by: PHYSICAL THERAPIST

## 2024-05-21 PROCEDURE — 97010 HOT OR COLD PACKS THERAPY: CPT | Mod: GP | Performed by: PHYSICAL THERAPIST

## 2024-05-21 PROCEDURE — 97110 THERAPEUTIC EXERCISES: CPT | Mod: GP | Performed by: PHYSICAL THERAPIST

## 2024-05-31 ENCOUNTER — THERAPY VISIT (OUTPATIENT)
Dept: PHYSICAL THERAPY | Facility: CLINIC | Age: 39
End: 2024-05-31
Payer: COMMERCIAL

## 2024-05-31 DIAGNOSIS — M25.562 ACUTE PAIN OF LEFT KNEE: Primary | ICD-10-CM

## 2024-05-31 DIAGNOSIS — R60.0 LOCALIZED EDEMA: ICD-10-CM

## 2024-05-31 DIAGNOSIS — Z98.890 S/P ARTHROSCOPIC RECONSTRUCTION OF ACL OF LEFT KNEE USING QUADRICEPS TENDON AUTOGRAFT: ICD-10-CM

## 2024-05-31 DIAGNOSIS — S83.512D RUPTURE OF ANTERIOR CRUCIATE LIGAMENT OF LEFT KNEE, SUBSEQUENT ENCOUNTER: ICD-10-CM

## 2024-05-31 DIAGNOSIS — Z87.39 S/P ARTHROSCOPIC RECONSTRUCTION OF ACL OF LEFT KNEE USING QUADRICEPS TENDON AUTOGRAFT: ICD-10-CM

## 2024-05-31 PROCEDURE — 97010 HOT OR COLD PACKS THERAPY: CPT | Mod: GP | Performed by: PHYSICAL THERAPIST

## 2024-05-31 PROCEDURE — 97110 THERAPEUTIC EXERCISES: CPT | Mod: GP | Performed by: PHYSICAL THERAPIST

## 2024-05-31 PROCEDURE — 97116 GAIT TRAINING THERAPY: CPT | Mod: GP | Performed by: PHYSICAL THERAPIST

## 2024-06-04 ENCOUNTER — THERAPY VISIT (OUTPATIENT)
Dept: PHYSICAL THERAPY | Facility: CLINIC | Age: 39
End: 2024-06-04
Payer: COMMERCIAL

## 2024-06-04 DIAGNOSIS — Z87.39 S/P ARTHROSCOPIC RECONSTRUCTION OF ACL OF LEFT KNEE USING QUADRICEPS TENDON AUTOGRAFT: ICD-10-CM

## 2024-06-04 DIAGNOSIS — Z98.890 S/P ARTHROSCOPIC RECONSTRUCTION OF ACL OF LEFT KNEE USING QUADRICEPS TENDON AUTOGRAFT: ICD-10-CM

## 2024-06-04 DIAGNOSIS — M25.562 ACUTE PAIN OF LEFT KNEE: Primary | ICD-10-CM

## 2024-06-04 DIAGNOSIS — R60.0 LOCALIZED EDEMA: ICD-10-CM

## 2024-06-04 DIAGNOSIS — S83.512D RUPTURE OF ANTERIOR CRUCIATE LIGAMENT OF LEFT KNEE, SUBSEQUENT ENCOUNTER: ICD-10-CM

## 2024-06-04 PROCEDURE — 97010 HOT OR COLD PACKS THERAPY: CPT | Mod: GP | Performed by: PHYSICAL THERAPIST

## 2024-06-04 PROCEDURE — 97110 THERAPEUTIC EXERCISES: CPT | Mod: GP | Performed by: PHYSICAL THERAPIST

## 2024-06-11 ENCOUNTER — THERAPY VISIT (OUTPATIENT)
Dept: PHYSICAL THERAPY | Facility: CLINIC | Age: 39
End: 2024-06-11
Payer: COMMERCIAL

## 2024-06-11 DIAGNOSIS — Z98.890 S/P ARTHROSCOPIC RECONSTRUCTION OF ACL OF LEFT KNEE USING QUADRICEPS TENDON AUTOGRAFT: ICD-10-CM

## 2024-06-11 DIAGNOSIS — Z87.39 S/P ARTHROSCOPIC RECONSTRUCTION OF ACL OF LEFT KNEE USING QUADRICEPS TENDON AUTOGRAFT: ICD-10-CM

## 2024-06-11 DIAGNOSIS — R60.0 LOCALIZED EDEMA: ICD-10-CM

## 2024-06-11 DIAGNOSIS — S83.512D RUPTURE OF ANTERIOR CRUCIATE LIGAMENT OF LEFT KNEE, SUBSEQUENT ENCOUNTER: ICD-10-CM

## 2024-06-11 DIAGNOSIS — M25.562 ACUTE PAIN OF LEFT KNEE: Primary | ICD-10-CM

## 2024-06-11 PROCEDURE — 97110 THERAPEUTIC EXERCISES: CPT | Mod: GP | Performed by: PHYSICAL THERAPIST

## 2024-06-18 ENCOUNTER — THERAPY VISIT (OUTPATIENT)
Dept: PHYSICAL THERAPY | Facility: CLINIC | Age: 39
End: 2024-06-18
Payer: COMMERCIAL

## 2024-06-18 DIAGNOSIS — S83.512D RUPTURE OF ANTERIOR CRUCIATE LIGAMENT OF LEFT KNEE, SUBSEQUENT ENCOUNTER: ICD-10-CM

## 2024-06-18 DIAGNOSIS — R60.0 LOCALIZED EDEMA: ICD-10-CM

## 2024-06-18 DIAGNOSIS — M25.562 ACUTE PAIN OF LEFT KNEE: Primary | ICD-10-CM

## 2024-06-18 DIAGNOSIS — Z87.39 S/P ARTHROSCOPIC RECONSTRUCTION OF ACL OF LEFT KNEE USING QUADRICEPS TENDON AUTOGRAFT: ICD-10-CM

## 2024-06-18 DIAGNOSIS — Z98.890 S/P ARTHROSCOPIC RECONSTRUCTION OF ACL OF LEFT KNEE USING QUADRICEPS TENDON AUTOGRAFT: ICD-10-CM

## 2024-06-18 PROCEDURE — 97110 THERAPEUTIC EXERCISES: CPT | Mod: GP | Performed by: PHYSICAL THERAPIST

## 2024-06-25 ENCOUNTER — THERAPY VISIT (OUTPATIENT)
Dept: PHYSICAL THERAPY | Facility: CLINIC | Age: 39
End: 2024-06-25
Payer: COMMERCIAL

## 2024-06-25 DIAGNOSIS — R60.0 LOCALIZED EDEMA: ICD-10-CM

## 2024-06-25 DIAGNOSIS — Z98.890 S/P ARTHROSCOPIC RECONSTRUCTION OF ACL OF LEFT KNEE USING QUADRICEPS TENDON AUTOGRAFT: ICD-10-CM

## 2024-06-25 DIAGNOSIS — M25.562 ACUTE PAIN OF LEFT KNEE: Primary | ICD-10-CM

## 2024-06-25 DIAGNOSIS — S83.512D RUPTURE OF ANTERIOR CRUCIATE LIGAMENT OF LEFT KNEE, SUBSEQUENT ENCOUNTER: ICD-10-CM

## 2024-06-25 DIAGNOSIS — Z87.39 S/P ARTHROSCOPIC RECONSTRUCTION OF ACL OF LEFT KNEE USING QUADRICEPS TENDON AUTOGRAFT: ICD-10-CM

## 2024-06-25 PROCEDURE — 97110 THERAPEUTIC EXERCISES: CPT | Mod: GP | Performed by: PHYSICAL THERAPIST

## 2024-07-10 ENCOUNTER — THERAPY VISIT (OUTPATIENT)
Dept: PHYSICAL THERAPY | Facility: CLINIC | Age: 39
End: 2024-07-10
Payer: COMMERCIAL

## 2024-07-10 DIAGNOSIS — Z87.39 S/P ARTHROSCOPIC RECONSTRUCTION OF ACL OF LEFT KNEE USING QUADRICEPS TENDON AUTOGRAFT: ICD-10-CM

## 2024-07-10 DIAGNOSIS — R60.0 LOCALIZED EDEMA: ICD-10-CM

## 2024-07-10 DIAGNOSIS — M25.562 ACUTE PAIN OF LEFT KNEE: Primary | ICD-10-CM

## 2024-07-10 DIAGNOSIS — S83.512D RUPTURE OF ANTERIOR CRUCIATE LIGAMENT OF LEFT KNEE, SUBSEQUENT ENCOUNTER: ICD-10-CM

## 2024-07-10 DIAGNOSIS — Z98.890 S/P ARTHROSCOPIC RECONSTRUCTION OF ACL OF LEFT KNEE USING QUADRICEPS TENDON AUTOGRAFT: ICD-10-CM

## 2024-07-10 PROCEDURE — 97110 THERAPEUTIC EXERCISES: CPT | Mod: GP | Performed by: PHYSICAL THERAPIST

## 2024-07-10 NOTE — PROGRESS NOTES
07/10/24 0500   Appointment Info   Signing clinician's name / credentials Nikolas Asuma, DPT, SCS   Total/Authorized Visits 24 PER EVAL AND TREAT   Visits Used 11   Medical Diagnosis Left knee pain, s/p left ACL-R   PT Tx Diagnosis Left knee pain, s/p left ACL-R   Quick Adds Certification   Progress Note/Certification   Start of Care Date 05/02/24   Onset of illness/injury or Date of Surgery 04/28/24   Therapy Frequency 2X/WEEK   Predicted Duration 12 WEEKS   Certification date from 05/02/24   Certification date to 07/25/24   Progress Note Due Date 06/27/24   GOALS   PT Goals 2   PT Goal 1   Goal Identifier Ambulation   Goal Description Patient will be able to walk >1 mile without AD or brace.   Rationale to maximize safety and independence with performance of ADLs and functional tasks;to maximize safety and independence within the home;to maximize safety and independence within the community;to maximize safety and independence with self cares   Goal Progress can walk about 1/2 mile feeling okay   Target Date 07/25/24   PT Goal 2   Goal Identifier Stairs   Goal Description Patient will be able to descend 20 steps with reciprocal pattern without a railing.   Rationale to maximize safety and independence with performance of ADLs and functional tasks;to maximize safety and independence within the home;to maximize safety and independence within the community;to maximize safety and independence with self cares   Goal Progress can go down slowly with railing   Target Date 08/20/24   Subjective Report   Subjective Report Emily reports her knee is coming along well and she feels like she is getting close to being ready to return to work.  She can go up stairs nearly normal but going down is pretty tough still. Walking feels pretty normal unless she wears her knee out, then it gets a little sore.  She is feeling more confident and stronger overall but can tell she needs to keep working on improving her ROM and strength.  "  Objective Measure 1   Objective Measure ROM   Details 3-0-122   Objective Measure 2   Objective Measure Gait   Details normal walking until fatigued, then begins to limp   Therapeutic Procedure/Exercise   Therapeutic Procedures: strength, endurance, ROM, flexibility minutes (85675) 40   Ther Proc 1 ideal knee extension   Ther Proc 1 - Details 10\" hold x10   Ther Proc 2 heel slide   Ther Proc 2 - Details 10\" hold x10   Ther Proc 3 RFE split squat   Ther Proc 3 - Details 2x10   Ther Proc 4 stationary bike   Ther Proc 4 - Details full rev x6 min SH 1   Ther Proc 5 lateral band walk   Ther Proc 5 - Details pink loop RENO x6   Ther Proc 6 leg press eccentric on left   Ther Proc 6 - Details SH 9 75 lb X20-30   Ther Proc 7 stool drag   Ther Proc 7 - Details RENO x4, 2 sets   Ther Proc 8 3\" forward step downs at wall   Ther Proc 8 - Details 2x15   Skilled Intervention exercise selection, instruction, patient education   Patient Response/Progress minimal pain, increased ROM   Education   Learner/Method Patient;Pictures/Video   Plan   Home program PTRX   Plan for next session CONTINUE quad strengthening, ROM   Total Session Time   Timed Code Treatment Minutes 40   Total Treatment Time (sum of timed and untimed services) 40         PLAN  Continue therapy per current plan of care.    Beginning/End Dates of Progress Note Reporting Period:   05/02/2024 to 07/10/2024    Referring Provider:  Cecelia Cobian    "

## 2024-07-15 ENCOUNTER — THERAPY VISIT (OUTPATIENT)
Dept: PHYSICAL THERAPY | Facility: CLINIC | Age: 39
End: 2024-07-15
Payer: COMMERCIAL

## 2024-07-15 DIAGNOSIS — R60.0 LOCALIZED EDEMA: ICD-10-CM

## 2024-07-15 DIAGNOSIS — S83.512D RUPTURE OF ANTERIOR CRUCIATE LIGAMENT OF LEFT KNEE, SUBSEQUENT ENCOUNTER: ICD-10-CM

## 2024-07-15 DIAGNOSIS — Z98.890 S/P ARTHROSCOPIC RECONSTRUCTION OF ACL OF LEFT KNEE USING QUADRICEPS TENDON AUTOGRAFT: ICD-10-CM

## 2024-07-15 DIAGNOSIS — Z87.39 S/P ARTHROSCOPIC RECONSTRUCTION OF ACL OF LEFT KNEE USING QUADRICEPS TENDON AUTOGRAFT: ICD-10-CM

## 2024-07-15 DIAGNOSIS — M25.562 ACUTE PAIN OF LEFT KNEE: Primary | ICD-10-CM

## 2024-07-15 PROCEDURE — 97140 MANUAL THERAPY 1/> REGIONS: CPT | Mod: GP | Performed by: STUDENT IN AN ORGANIZED HEALTH CARE EDUCATION/TRAINING PROGRAM

## 2024-07-15 PROCEDURE — 97110 THERAPEUTIC EXERCISES: CPT | Mod: GP | Performed by: STUDENT IN AN ORGANIZED HEALTH CARE EDUCATION/TRAINING PROGRAM

## 2024-07-26 ENCOUNTER — THERAPY VISIT (OUTPATIENT)
Dept: PHYSICAL THERAPY | Facility: CLINIC | Age: 39
End: 2024-07-26
Payer: COMMERCIAL

## 2024-07-26 DIAGNOSIS — M25.562 ACUTE PAIN OF LEFT KNEE: Primary | ICD-10-CM

## 2024-07-26 DIAGNOSIS — R60.0 LOCALIZED EDEMA: ICD-10-CM

## 2024-07-26 DIAGNOSIS — S83.512D RUPTURE OF ANTERIOR CRUCIATE LIGAMENT OF LEFT KNEE, SUBSEQUENT ENCOUNTER: ICD-10-CM

## 2024-07-26 DIAGNOSIS — Z98.890 S/P ARTHROSCOPIC RECONSTRUCTION OF ACL OF LEFT KNEE USING QUADRICEPS TENDON AUTOGRAFT: ICD-10-CM

## 2024-07-26 DIAGNOSIS — Z87.39 S/P ARTHROSCOPIC RECONSTRUCTION OF ACL OF LEFT KNEE USING QUADRICEPS TENDON AUTOGRAFT: ICD-10-CM

## 2024-07-26 PROCEDURE — 97110 THERAPEUTIC EXERCISES: CPT | Mod: GP | Performed by: PHYSICAL THERAPIST

## 2024-07-26 PROCEDURE — 97140 MANUAL THERAPY 1/> REGIONS: CPT | Mod: GP | Performed by: PHYSICAL THERAPIST

## 2024-07-26 NOTE — PROGRESS NOTES
[facility]                                                                                   OUTPATIENT PHYSICAL THERAPY    PLAN OF TREATMENT FOR OUTPATIENT REHABILITATION   Patient's Last Name, First Name, Emily Benites YOB: 1985   Provider's Name   [facility]   Medical Record No.  [id number]     Onset Date: 04/28/24  Start of Care Date: 05/02/24     Medical Diagnosis:  Left knee pain, s/p left ACL-R      PT Treatment Diagnosis:  Left knee pain, s/p left ACL-R Plan of Treatment  Frequency/Duration: 1x/week for 2 weeks, reducing to every other week for 10 weeks/ 12 WEEKS    Certification date from 07/26/24 to 10/18/24         See note for plan of treatment details and functional goals     Juan Manuel Antunez, PT                         I CERTIFY THE NEED FOR THESE SERVICES FURNISHED UNDER        THIS PLAN OF TREATMENT AND WHILE UNDER MY CARE .             Physician Signature               Date    X_____________________________________________________                  Referring Provider:  Cecelia Cobian    Initial Assessment  See Epic Evaluation- Start of Care Date: 05/02/24 07/26/24 0500   Appointment Info   Signing clinician's name / credentials Nikolas Antunez, ESTHERT, SCS   Total/Authorized Visits 24 PER EVAL AND TREAT   Visits Used 13   Medical Diagnosis Left knee pain, s/p left ACL-R   PT Tx Diagnosis Left knee pain, s/p left ACL-R   Quick Adds Certification   Progress Note/Certification   Start of Care Date 05/02/24   Onset of illness/injury or Date of Surgery 04/28/24   Therapy Frequency 1x/week for 2 weeks, reducing to every other week for 10 weeks   Predicted Duration 12 WEEKS   Certification date from 07/26/24   Certification date to 10/18/24   Progress Note Due Date 06/27/24   GOALS   PT Goals 3   PT Goal 1   Goal Identifier Ambulation   Goal Description Patient will be able to walk >1 mile without AD or brace.    Rationale to maximize safety and independence with performance of ADLs and functional tasks;to maximize safety and independence within the home;to maximize safety and independence within the community;to maximize safety and independence with self cares   Goal Progress can walk 1 mile without pain   Target Date 07/25/24   Date Met 07/26/24   PT Goal 2   Goal Identifier Stairs   Goal Description Patient will be able to descend 20 steps with reciprocal pattern without a railing.   Rationale to maximize safety and independence with performance of ADLs and functional tasks;to maximize safety and independence within the home;to maximize safety and independence within the community;to maximize safety and independence with self cares   Goal Progress can go down reciprocally with better control, rail used for balance   Target Date 08/20/24   PT Goal 3   Goal Identifier Squatting   Goal Description Patient will be able to perform full double leg squat without pain.   Rationale to maximize safety and independence with performance of ADLs and functional tasks;to maximize safety and independence within the home;to maximize safety and independence within the community;to maximize safety and independence with self cares   Goal Progress Can squat to 75-80 degrees without pain.   Target Date 10/18/24   Subjective Report   Subjective Report Emily reports her knee is coming along well.  She is set to return to work next week.  She has no issues with walking and going up stairs.  Going down stairs is getting better but not as normal as going up yet.  She feels like her ROM is coming along well and her strength is improving but is her biggest deficit.   Objective Measures   Objective Measures Objective Measure 3   Objective Measure 1   Objective Measure ROM   Details 4-0-128   Objective Measure 2   Objective Measure Gait   Details normal walking gait   Objective Measure 3   Objective Measure Functional mobility   Details Double leg  "squat to 75-80 degrees without pain, off loads to right and has pain below that   Therapeutic Procedure/Exercise   Therapeutic Procedures: strength, endurance, ROM, flexibility minutes (03503) 32   Ther Proc 1 RFE split squat   Ther Proc 1 - Details 2x12   Ther Proc 2 lateral band walk   Ther Proc 2 - Details RENO x6 with pink loop   Ther Proc 3 seated knee flexion   Ther Proc 3 - Details 10\" hold x5   Ther Proc 4 stationary bike   Ther Proc 4 - Details full rev x6 min SH 1   Ther Proc 5 forward step down   Ther Proc 5 - Details 2x12   Ther Proc 6 SL press 60 lb   Ther Proc 6 - Details x3 sets to fatigue SH 8   Ther Proc 7 Airplanes with yellow band   Ther Proc 7 - Details 30\" x3   Skilled Intervention exercise selection, instruction, patient education   Patient Response/Progress minimal pain, increased ROM   Manual Therapy   Manual Therapy: Mobilization, MFR, MLD, friction massage minutes (34477) 8   Manual Therapy 1 STM to distal quad, VMO, in knee flexion   Skilled Intervention to reduce tissue tightness   Patient Response/Progress improved knee ROM   Education   Learner/Method Patient;Pictures/Video   Plan   Home program PTRX   Plan for next session continue strengthening, end range flexion   Total Session Time   Timed Code Treatment Minutes 40   Total Treatment Time (sum of timed and untimed services) 40       PLAN  Continue therapy per current plan of care.    Beginning/End Dates of Progress Note Reporting Period:   05/02/2024 to 07/26/2024    Referring Provider:  Cecelia Cobian    "

## 2024-08-23 ENCOUNTER — THERAPY VISIT (OUTPATIENT)
Dept: PHYSICAL THERAPY | Facility: CLINIC | Age: 39
End: 2024-08-23
Payer: COMMERCIAL

## 2024-08-23 DIAGNOSIS — M25.562 ACUTE PAIN OF LEFT KNEE: Primary | ICD-10-CM

## 2024-08-23 DIAGNOSIS — Z98.890 S/P ARTHROSCOPIC RECONSTRUCTION OF ACL OF LEFT KNEE USING QUADRICEPS TENDON AUTOGRAFT: ICD-10-CM

## 2024-08-23 DIAGNOSIS — Z87.39 S/P ARTHROSCOPIC RECONSTRUCTION OF ACL OF LEFT KNEE USING QUADRICEPS TENDON AUTOGRAFT: ICD-10-CM

## 2024-08-23 DIAGNOSIS — R60.0 LOCALIZED EDEMA: ICD-10-CM

## 2024-08-23 DIAGNOSIS — S83.512D RUPTURE OF ANTERIOR CRUCIATE LIGAMENT OF LEFT KNEE, SUBSEQUENT ENCOUNTER: ICD-10-CM

## 2024-08-23 PROCEDURE — 97110 THERAPEUTIC EXERCISES: CPT | Mod: GP | Performed by: PHYSICAL THERAPIST

## 2024-09-06 ENCOUNTER — THERAPY VISIT (OUTPATIENT)
Dept: PHYSICAL THERAPY | Facility: CLINIC | Age: 39
End: 2024-09-06
Payer: COMMERCIAL

## 2024-09-06 DIAGNOSIS — Z98.890 S/P ARTHROSCOPIC RECONSTRUCTION OF ACL OF LEFT KNEE USING QUADRICEPS TENDON AUTOGRAFT: ICD-10-CM

## 2024-09-06 DIAGNOSIS — S83.512D RUPTURE OF ANTERIOR CRUCIATE LIGAMENT OF LEFT KNEE, SUBSEQUENT ENCOUNTER: ICD-10-CM

## 2024-09-06 DIAGNOSIS — M25.562 ACUTE PAIN OF LEFT KNEE: Primary | ICD-10-CM

## 2024-09-06 DIAGNOSIS — Z87.39 S/P ARTHROSCOPIC RECONSTRUCTION OF ACL OF LEFT KNEE USING QUADRICEPS TENDON AUTOGRAFT: ICD-10-CM

## 2024-09-06 DIAGNOSIS — R60.0 LOCALIZED EDEMA: ICD-10-CM

## 2024-09-06 PROCEDURE — 97110 THERAPEUTIC EXERCISES: CPT | Mod: GP | Performed by: PHYSICAL THERAPIST

## 2024-10-11 ENCOUNTER — THERAPY VISIT (OUTPATIENT)
Dept: PHYSICAL THERAPY | Facility: CLINIC | Age: 39
End: 2024-10-11
Payer: COMMERCIAL

## 2024-10-11 DIAGNOSIS — Z98.890 S/P ARTHROSCOPIC RECONSTRUCTION OF ACL OF LEFT KNEE USING QUADRICEPS TENDON AUTOGRAFT: ICD-10-CM

## 2024-10-11 DIAGNOSIS — M25.562 ACUTE PAIN OF LEFT KNEE: Primary | ICD-10-CM

## 2024-10-11 DIAGNOSIS — Z87.39 S/P ARTHROSCOPIC RECONSTRUCTION OF ACL OF LEFT KNEE USING QUADRICEPS TENDON AUTOGRAFT: ICD-10-CM

## 2024-10-11 DIAGNOSIS — R60.0 LOCALIZED EDEMA: ICD-10-CM

## 2024-10-11 DIAGNOSIS — S83.512D RUPTURE OF ANTERIOR CRUCIATE LIGAMENT OF LEFT KNEE, SUBSEQUENT ENCOUNTER: ICD-10-CM

## 2024-10-11 PROCEDURE — 97110 THERAPEUTIC EXERCISES: CPT | Mod: GP | Performed by: PHYSICAL THERAPIST

## 2024-10-11 NOTE — PROGRESS NOTES
10/11/24 0500   Appointment Info   Signing clinician's name / credentials Nikolas Asuma, DPT, SCS   Total/Authorized Visits 24 PER EVAL AND TREAT   Visits Used 15   Medical Diagnosis Left knee pain, s/p left ACL-R   PT Tx Diagnosis Left knee pain, s/p left ACL-R   Quick Adds Certification   Progress Note/Certification   Start of Care Date 05/02/24   Onset of illness/injury or Date of Surgery 04/28/24   Therapy Frequency 1x/month   Predicted Duration 12 WEEKS   Certification date from 07/26/24   Certification date to 10/18/24   Progress Note Due Date 06/27/24   PT Goal 1   Goal Identifier Ambulation   Goal Description Patient will be able to walk >1 mile without AD or brace.   Rationale to maximize safety and independence with performance of ADLs and functional tasks;to maximize safety and independence within the home;to maximize safety and independence within the community;to maximize safety and independence with self cares   Goal Progress can walk 1 mile without pain   Target Date 07/25/24   Date Met 07/26/24   PT Goal 2   Goal Identifier Stairs   Goal Description Patient will be able to descend 20 steps with reciprocal pattern without a railing.   Rationale to maximize safety and independence with performance of ADLs and functional tasks;to maximize safety and independence within the home;to maximize safety and independence within the community;to maximize safety and independence with self cares   Goal Progress 20 steps without railing   Target Date 08/20/24   Date Met 09/06/24   PT Goal 3   Goal Identifier Squatting   Goal Description Patient will be able to perform full double leg squat without pain.   Rationale to maximize safety and independence with performance of ADLs and functional tasks;to maximize safety and independence within the home;to maximize safety and independence within the community;to maximize safety and independence with self cares   Goal Progress can squat down to the floor but shifts weight  "to right LE   Target Date 10/18/24   Subjective Report   Subjective Report Emily reports her knee is overall doing pretty well.  She can squat down to get things from bottom shelves now, but she has to shift to her right side at the bottom.  She does not feel like she can jog or run at this point and does not yet trust her left leg as much as her right.   Objective Measure 1   Objective Measure ROM   Details 4-0-135   Objective Measure 2   Objective Measure Gait   Details Walking gait normal, jogging gait antalgic but demonstrates fair loading response and hip control   Objective Measure 3   Objective Measure Functional mobility   Details double leg squat to >90 degrees without weight shift, shift occurs at end ranges of >110   Therapeutic Procedure/Exercise   Therapeutic Procedures: strength, endurance, ROM, flexibility minutes (23256) 40   Ther Proc 1 bike x5 min   Ther Proc 1 - Details level 3   Ther Proc 2 all 4's knee flexion stretch   Ther Proc 2 - Details 5\" hold x10 with towel roll behind knee   Ther Proc 3 walking lunge   Ther Proc 3 - Details RENO x4   Ther Proc 4 skater sliders   Ther Proc 4 - Details 2x10 B   Ther Proc 5 4' box lateral step overs   Ther Proc 5 - Details 30\" x2   Ther Proc 6 4\" box forward alternating quick steps   Ther Proc 6 - Details 30\" x2   Ther Proc 7 mini-high knee quick steps   Ther Proc 7 - Details RENO x2   Skilled Intervention exercise progression   Patient Response/Progress fatigue, no pain   Education   Learner/Method Patient;Pictures/Video   Plan   Home program PTRX   Plan for next session single leg mini-step plyos, double leg tap jumps, pogo hops   Total Session Time   Timed Code Treatment Minutes 40   Total Treatment Time (sum of timed and untimed services) 40         PLAN  Continue therapy per current plan of care.    Beginning/End Dates of Progress Note Reporting Period:   7/26/2024 to 10/11/2024    Referring Provider:  Cecelia Cobian    "

## 2024-11-15 ENCOUNTER — THERAPY VISIT (OUTPATIENT)
Dept: PHYSICAL THERAPY | Facility: CLINIC | Age: 39
End: 2024-11-15
Payer: COMMERCIAL

## 2024-11-15 DIAGNOSIS — Z98.890 S/P ARTHROSCOPIC RECONSTRUCTION OF ACL OF LEFT KNEE USING QUADRICEPS TENDON AUTOGRAFT: ICD-10-CM

## 2024-11-15 DIAGNOSIS — Z87.39 S/P ARTHROSCOPIC RECONSTRUCTION OF ACL OF LEFT KNEE USING QUADRICEPS TENDON AUTOGRAFT: ICD-10-CM

## 2024-11-15 DIAGNOSIS — M25.562 ACUTE PAIN OF LEFT KNEE: Primary | ICD-10-CM

## 2024-11-15 DIAGNOSIS — S83.512D RUPTURE OF ANTERIOR CRUCIATE LIGAMENT OF LEFT KNEE, SUBSEQUENT ENCOUNTER: ICD-10-CM

## 2024-11-15 DIAGNOSIS — R60.0 LOCALIZED EDEMA: ICD-10-CM

## 2024-11-15 PROCEDURE — 97110 THERAPEUTIC EXERCISES: CPT | Mod: GP | Performed by: PHYSICAL THERAPIST

## 2024-11-15 NOTE — PROGRESS NOTES
[facility]                                                                                   OUTPATIENT PHYSICAL THERAPY    PLAN OF TREATMENT FOR OUTPATIENT REHABILITATION   Patient's Last Name, First Name, Emily Benites YOB: 1985   Provider's Name   [facility]   Medical Record No.  [id number]     Onset Date: 04/28/24  Start of Care Date: 05/02/24     Medical Diagnosis:  Left knee pain, s/p left ACL-R      PT Treatment Diagnosis:  Left knee pain, s/p left ACL-R Plan of Treatment  Frequency/Duration: 1x/month/ 1 month    Certification date from 10/19/24 to 11/19/24         See note for plan of treatment details and functional goals     Juan Manuel Antunez PT                         I CERTIFY THE NEED FOR THESE SERVICES FURNISHED UNDER        THIS PLAN OF TREATMENT AND WHILE UNDER MY CARE .             Physician Signature               Date    X_____________________________________________________                  Referring Provider:  Cecelia Cobian    Initial Assessment  See Epic Evaluation- Start of Care Date: 05/02/24         11/15/24 0500   Appointment Info   Signing clinician's name / credentials Nikolas Harmony, DPT, SCS   Total/Authorized Visits 24 PER EVAL AND TREAT   Visits Used 16   Medical Diagnosis Left knee pain, s/p left ACL-R   PT Tx Diagnosis Left knee pain, s/p left ACL-R   Progress Note/Certification   Start of Care Date 05/02/24   Onset of illness/injury or Date of Surgery 04/28/24   Therapy Frequency 1x/month   Predicted Duration 1 month   Certification date from 10/19/24   Certification date to 11/19/24   Progress Note Due Date 06/27/24   PT Goal 1   Goal Identifier Ambulation   Goal Description Patient will be able to walk >1 mile without AD or brace.   Rationale to maximize safety and independence with performance of ADLs and functional tasks;to maximize safety and independence within the home;to maximize safety  and independence within the community;to maximize safety and independence with self cares   Goal Progress can walk 1 mile without pain   Target Date 07/25/24   Date Met 07/26/24   PT Goal 2   Goal Identifier Stairs   Goal Description Patient will be able to descend 20 steps with reciprocal pattern without a railing.   Rationale to maximize safety and independence with performance of ADLs and functional tasks;to maximize safety and independence within the home;to maximize safety and independence within the community;to maximize safety and independence with self cares   Goal Progress 20 steps without railing   Target Date 08/20/24   Date Met 09/06/24   PT Goal 3   Goal Identifier Squatting   Goal Description Patient will be able to perform full double leg squat without pain.   Rationale to maximize safety and independence with performance of ADLs and functional tasks;to maximize safety and independence within the home;to maximize safety and independence within the community;to maximize safety and independence with self cares   Goal Progress can squat to the floor, some difficulty pushing back up   Target Date 10/18/24   Date Met 11/15/24   Subjective Report   Subjective Report Emily reports her knee is feeling really good.  Her only complaint is with kneeling and standing back up form a deep squat.  She is working without any issues and can jog if she needs to.  At this point she feels like she is able to continue her HEP on her own and is very happy with the funciton of her knee.   Objective Measure 1   Objective Measure ROM   Details 4-0-135   Objective Measure 2   Objective Measure Gait   Details Walking gait normal, jogging gait antalgic but demonstrates fair loading response and hip control   Objective Measure 3   Objective Measure Strength   Details Left knee extension strength on Tindeq:  61 lb vs 131 lb on right (46.6% LSI)   Therapeutic Procedure/Exercise   Therapeutic Procedures: strength, endurance, ROM,  flexibility minutes (03129) 35   Ther Proc 1 bike x5 min   Ther Proc 1 - Details level 3   Ther Proc 4 skater sliders   Ther Proc 4 - Details 2x15 B with orange med ball   Ther Proc 5 split squats   Ther Proc 5 - Details 2x15 B   Ther Proc 6 knee extension strength testing   Ther Proc 6 - Details x8 min   Skilled Intervention exercise progression   Patient Response/Progress fatigue, no pain   Education   Learner/Method Patient;Pictures/Video   Plan   Home program PTRX   Updates to plan of care DISCHARGE FROM PT   Total Session Time   Timed Code Treatment Minutes 35   Total Treatment Time (sum of timed and untimed services) 35           DISCHARGE  Reason for Discharge: Patient has met all goals.    Equipment Issued: none    Discharge Plan: Patient to continue home program.    Referring Provider:  Cecelia Cobian